# Patient Record
Sex: FEMALE | Race: BLACK OR AFRICAN AMERICAN | Employment: FULL TIME | URBAN - METROPOLITAN AREA
[De-identification: names, ages, dates, MRNs, and addresses within clinical notes are randomized per-mention and may not be internally consistent; named-entity substitution may affect disease eponyms.]

---

## 2017-01-02 ENCOUNTER — TELEPHONE (OUTPATIENT)
Dept: UROLOGY | Facility: CLINIC | Age: 57
End: 2017-01-02

## 2017-01-02 DIAGNOSIS — R10.9 FLANK PAIN: Primary | ICD-10-CM

## 2017-01-02 RX ORDER — TRAMADOL HYDROCHLORIDE 50 MG/1
50 TABLET ORAL EVERY 6 HOURS PRN
Qty: 30 TABLET | Refills: 0 | COMMUNITY
Start: 2017-01-02 | End: 2017-01-19

## 2017-01-02 NOTE — TELEPHONE ENCOUNTER
Patient called regarding tramadol script which falls under her allergies  But she states that it was IV pain meds never oral  She will be aware of itching or any side effects and stop immediately Yadira Cano LPN Staff Nurse

## 2017-01-02 NOTE — TELEPHONE ENCOUNTER
Patient called regarding her back and flank pain from her kidney stone  This pain is at level 7 and she is trying to use motrin.  Dr Galeano ordered  Tramadol 50 mg  # 30 take 1-2 every 4-6 hours as needed.  Patient called and told [facility]. Yadira Cano LPN Staff Nurse

## 2017-01-03 DIAGNOSIS — N20.0 CALCULUS OF KIDNEY: Primary | ICD-10-CM

## 2017-01-04 ENCOUNTER — DOCUMENTATION ONLY (OUTPATIENT)
Dept: UROLOGY | Facility: CLINIC | Age: 57
End: 2017-01-04

## 2017-01-04 RX ORDER — NITROFURANTOIN 25; 75 MG/1; MG/1
100 CAPSULE ORAL 2 TIMES DAILY
Qty: 20 CAPSULE | Refills: 0 | Status: SHIPPED | OUTPATIENT
Start: 2017-01-04 | End: 2017-01-19

## 2017-01-17 ENCOUNTER — ANESTHESIA EVENT (OUTPATIENT)
Dept: SURGERY | Facility: CLINIC | Age: 57
End: 2017-01-17
Payer: COMMERCIAL

## 2017-01-19 ENCOUNTER — ALLIED HEALTH/NURSE VISIT (OUTPATIENT)
Dept: SURGERY | Facility: CLINIC | Age: 57
End: 2017-01-19

## 2017-01-19 ENCOUNTER — OFFICE VISIT (OUTPATIENT)
Dept: SURGERY | Facility: CLINIC | Age: 57
End: 2017-01-19

## 2017-01-19 VITALS
WEIGHT: 179 LBS | DIASTOLIC BLOOD PRESSURE: 88 MMHG | TEMPERATURE: 98.2 F | SYSTOLIC BLOOD PRESSURE: 150 MMHG | HEIGHT: 63 IN | RESPIRATION RATE: 16 BRPM | OXYGEN SATURATION: 100 % | BODY MASS INDEX: 31.71 KG/M2 | HEART RATE: 75 BPM

## 2017-01-19 DIAGNOSIS — D64.9 ANEMIA, UNSPECIFIED TYPE: ICD-10-CM

## 2017-01-19 DIAGNOSIS — N39.0 URINARY TRACT INFECTION WITHOUT HEMATURIA, SITE UNSPECIFIED: ICD-10-CM

## 2017-01-19 DIAGNOSIS — N39.0 URINARY TRACT INFECTION WITHOUT HEMATURIA, SITE UNSPECIFIED: Primary | ICD-10-CM

## 2017-01-19 LAB
ALBUMIN UR-MCNC: NEGATIVE MG/DL
ANION GAP SERPL CALCULATED.3IONS-SCNC: 10 MMOL/L (ref 3–14)
APPEARANCE UR: ABNORMAL
BILIRUB UR QL STRIP: NEGATIVE
BUN SERPL-MCNC: 10 MG/DL (ref 7–30)
CALCIUM SERPL-MCNC: 8.7 MG/DL (ref 8.5–10.1)
CAOX CRY #/AREA URNS HPF: ABNORMAL /HPF
CHLORIDE SERPL-SCNC: 108 MMOL/L (ref 94–109)
CO2 SERPL-SCNC: 25 MMOL/L (ref 20–32)
COLOR UR AUTO: YELLOW
CREAT SERPL-MCNC: 0.73 MG/DL (ref 0.52–1.04)
ERYTHROCYTE [DISTWIDTH] IN BLOOD BY AUTOMATED COUNT: 13.4 % (ref 10–15)
GFR SERPL CREATININE-BSD FRML MDRD: 82 ML/MIN/1.7M2
GLUCOSE SERPL-MCNC: 114 MG/DL (ref 70–99)
GLUCOSE UR STRIP-MCNC: NEGATIVE MG/DL
HCT VFR BLD AUTO: 37.9 % (ref 35–47)
HGB BLD-MCNC: 11.9 G/DL (ref 11.7–15.7)
HGB UR QL STRIP: ABNORMAL
KETONES UR STRIP-MCNC: NEGATIVE MG/DL
LEUKOCYTE ESTERASE UR QL STRIP: NEGATIVE
MCH RBC QN AUTO: 28 PG (ref 26.5–33)
MCHC RBC AUTO-ENTMCNC: 31.4 G/DL (ref 31.5–36.5)
MCV RBC AUTO: 89 FL (ref 78–100)
MUCOUS THREADS #/AREA URNS LPF: PRESENT /LPF
NITRATE UR QL: NEGATIVE
PH UR STRIP: 6 PH (ref 5–7)
PLATELET # BLD AUTO: 341 10E9/L (ref 150–450)
POTASSIUM SERPL-SCNC: 4 MMOL/L (ref 3.4–5.3)
RBC # BLD AUTO: 4.25 10E12/L (ref 3.8–5.2)
RBC #/AREA URNS AUTO: 53 /HPF (ref 0–2)
SODIUM SERPL-SCNC: 143 MMOL/L (ref 133–144)
SP GR UR STRIP: 1.02 (ref 1–1.03)
SQUAMOUS #/AREA URNS AUTO: 20 /HPF (ref 0–1)
URN SPEC COLLECT METH UR: ABNORMAL
UROBILINOGEN UR STRIP-MCNC: 2 MG/DL (ref 0–2)
WBC # BLD AUTO: 10.6 10E9/L (ref 4–11)
WBC #/AREA URNS AUTO: 1 /HPF (ref 0–2)

## 2017-01-19 ASSESSMENT — LIFESTYLE VARIABLES: TOBACCO_USE: 1

## 2017-01-19 NOTE — PATIENT INSTRUCTIONS
Preparing for Your Surgery      Name:  Sophia Andrew   MRN:  [id number]   :  1960   Today's Date:  2017     Arriving for surgery:  Surgery date:  17  Surgery time:  11:45 am  Arrival time:  9:45am  Please come to:     [facility] [address]  [address] [place]  [address]    - parking is available in front of [facility] from 5:15AM to 8:00PM. If you prefer, park your car in the Green Lot.    -Proceed to the 3rd floor, check in at the Adult Surgery Waiting Lounge. [phone]    If an escort is needed stop at the Information Desk in the lobby. Inform the information person that you are here for surgery. An escort to the Adult Surgery Waiting Lounge will be provided.        What can I eat or drink?  -  You may have solid food or milk products until 8 hours prior to your surgery.  -  You may have water, apple juice or 7up/Sprite until 2 hours prior to your surgery.    Which medicines can I take?  -  Do NOT take these medications in the morning, the day of surgery:  Ibuprofen (stop 2 days before surgery)    How do I prepare myself?  -  Take two showers: one the night before surgery; and one the morning of surgery.         Use Scrubcare or Hibiclens to wash from neck down.  You may use your own shampoo and conditioner. No other hair products.   -  Do NOT use lotion, powder, deodorant, or antiperspirant the day of your surgery.  -  Do NOT wear any makeup, fingernail polish or jewelry.  -Do not bring your own medications to the hospital, except for inhalers and eye drops.  -  Bring your ID and insurance card.    Questions or Concerns:  If you have questions or concerns, please call the  Preoperative Assessment Center, Monday-Friday 7AM-7PM:  [phone]  AFTER YOUR SURGERY  Breathing exercises   Breathing exercises help you recover faster. Take deep breaths and let the air out slowly. This will:     Help you wake up after surgery.    Help  prevent complications like pneumonia.  Preventing complications will help you go home sooner.   We may give you a breathing device (incentive spirometer) to encourage you to breathe deeply.   Nausea and vomiting   You may feel sick to your stomach after surgery; if so, let your nurse know.    Pain control:  After surgery, you may have pain. Our goal is to help you manage your pain. Pain medicine will help you feel comfortable enough to do activities that will help you heal.  These activities may include breathing exercises, walking and physical therapy.   To help your health care team treat your pain we will ask: 1) If you have pain  2) where it is located 3) describe your pain in your words  Methods of pain control include medications given by mouth, vein or by nerve block for some surgeries.  We may give you a pain control pump that will:  1) Deliver the medicine through a tube placed in your vein  2) Control the amount of medicine you receive  3) Allow you to push a button to deliver a dose of pain medicine  Sequential Compression Device (SCD) or Pneumo Boots:  You may need to wear SCD S on your legs or feet. These are wraps connected to a machine that pumps in air and releases it. The repeated pumping helps prevent blood clots from forming.

## 2017-01-19 NOTE — ANESTHESIA PREPROCEDURE EVALUATION
Anesthesia Evaluation     . Pt has had prior anesthetic. Type: General and MAC    No history of anesthetic complications     ROS/MED HX    ENT/Pulmonary: Comment: Quit smoking in July 2016    (+)MONTANA risk factors hypertension, tobacco use, Past use 40 years of 1/2 PPD packs/day  , . .   (-) recent URI   Neurologic:  - neg neurologic ROS     Cardiovascular:     (+) hypertension-range: 150 / 80's, ---. Taking blood thinners Pt has received instructions: Instructions Given to patient: hold ibuprofen 1-2 before surgery. . . :. . Previous cardiac testing date:results:date: results:ECG reviewed date:08/16 results:Sinus tachycardia, T wave inversion in inferior leads and ? Anterolateral ischemia date: results:          METS/Exercise Tolerance:  >4 METS   Hematologic: Comments: CBC, panel normal    (+) Anemia, -      Musculoskeletal: Comment: Arthritis in knees  (+) arthritis, , , -       GI/Hepatic:  - neg GI/hepatic ROS      (-) GERD   Renal/Genitourinary:     (+) Nephrolithiasis ,       Endo:  - neg endo ROS       Psychiatric:  - neg psychiatric ROS       Infectious Disease:  - neg infectious disease ROS       Malignancy:      - no malignancy   Other:    (+) No chance of pregnancy              Physical Exam  Normal systems: cardiovascular and pulmonary    Airway   Mallampati: III  TM distance: >3 FB  Neck ROM: full    Dental   (+) missing    Cardiovascular   Rhythm and rate: regular and normal      Pulmonary    breath sounds clear to auscultation    Other findings:   1/19/2017 16:02  Sodium: 143  Potassium: 4.0  Chloride: 108  Carbon Dioxide: 25  Urea Nitrogen: 10  Creatinine: 0.73  GFR Estimate: 82  GFR Estimate If Black: >90...  Calcium: 8.7  Anion Gap: 10  Glucose: 114 (H)    WBC: 10.6  Hemoglobin: 11.9  Hematocrit: 37.9  Platelet Count: 341  RBC Count: 4.25  MCV: 89  MCH: 28.0  MCHC: 31.4 (L)  RDW: 13.4    1/19/2017 16:07  Color Urine: Yellow  Appearance Urine: Cloudy  Glucose Urine: Negative  Bilirubin Urine:  Negative  Ketones Urine: Negative  Specific Gravity Urine: 1.024  pH Urine: 6.0  Protein Albumin Urine: Negative  Urobilinogen mg/dL: 2.0  Nitrite Urine: Negative  Blood Urine: Small (A)  Leukocyte Esterase Urine: Negative  Source: Midstream Urine  WBC Urine: 1  RBC Urine: 53 (H)  Squamous Epithelial /HPF Urine: 20 (H)  Mucous Urine: Present (A)  Calcium Oxalate: Many (A)             PAC Discussion and Assessment    ASA Classification: 2  Case is suitable for: [place]  Anesthetic techniques and relevant risks discussed: GA  Invasive monitoring and risk discussed: No  Types:   Possibility and Risk of blood transfusion discussed: No  NPO instructions given:   Additional anesthetic preparation and risks discussed:   Needs early admission to pre-op area:   Other:     PAC Resident/NP Anesthesia Assessment:  Scheduled for Left Percutaneous Nephrolithotomy with Fulguration Of Calyceal Diverticulum  on 2/9/16 by Dr. Galeano in treatment of recurrent kidney stones.  PAC referral for risk assessment and optimization for anesthesia with comorbid conditions of:    56 y.o. With recurrent kidney stones.  This is her 13th surgery.  No problems with anesthesia.      Pre-operative considerations:  1.  Cardiac:  Functional status very good.  Can walk up to 12 blocks but doesn't do any routine exercise. 0.4% risk of major adverse cardiac event.   Has hypertension but is reluctant to get treatment.  Discussed risks.  B/p 150/88 at baseline.    2.  Pulm:  Airway feasible.  MONTANA risk:  Low.  Remote smoker.   3.  GI:  Risk of PONV score = 2.  If > 2, anti-emetic intervention recommended.  4.  : Recurrent stones.   Recent UTI completed 10 days of antibiotic (not sure which one).  Check UA, UC    VTE risk:  0.26%    Patient is optimized and is acceptable candidate for the proposed procedure.  No further diagnostic evaluation is needed.     Discussed with Dr. Cam. See recommendations below.           Reviewed and Signed by  PAC Mid-Level Provider/Resident  Mid-Level Provider/Resident: Ivis Bourgeois PA-C  Date: 1/19/17  Time: 1542    Attending Anesthesiologist Anesthesia Assessment:  56 year old very well known to anesthesia here for multiple nephrolithotomies in treatment of recurrent kidney stones. Patient/case discussed with SITA. No need to see patient. Patient is appropriate for the planned procedure without further work-up or medical management.      Reviewed and Signed by PAC Anesthesiologist  Anesthesiologist: vahe  Date: 1/19/2017  Time:   Pass/Fail: Pass  Disposition:     PAC Pharmacist Assessment:        Pharmacist:   Date:   Time:      Anesthesia Plan      History & Physical Review  History and physical reviewed and following examination; no interval change.    ASA Status:  2 .    NPO Status:  > 8 hours    Plan for General and ETT with Intravenous and Propofol induction. Maintenance will be Balanced.    PONV prophylaxis:  Ondansetron (or other 5HT-3)       Postoperative Care  Postoperative pain management:  IV analgesics and Oral pain medications.      Consents  Anesthetic plan, risks, benefits and alternatives discussed with:  Patient.  Use of blood products discussed: No .   .                          .

## 2017-01-19 NOTE — H&P
Pre-Operative H & P     CC:  Preoperative exam to assess for increased cardiopulmonary risk while undergoing surgery and anesthesia.    Date of Encounter: 2017  Primary Care Physician:  [facility]  Sophia Andrew is a 56 year old female who presents for pre-operative H & P in preparation for Left Percutaneous Nephrolithotomy with Fulguration Of Calyceal Diverticulum  on 16 by Dr. Galeano in treatment of recurrent kidney stones.  Surgery at [facility].     History of recurrent kidney stones.  This is her 13th surgery.  Recent  UTI, treated with 10 days of antibiotic.  No dysuria.  No hematuria.     History is obtained from the patient and electronic health record.     Past Medical History  Past Medical History   Diagnosis Date     Hypertension      Kidney stones 2011     nephrostomy tube       Past Surgical History  Past Surgical History   Procedure Laterality Date     Hysterectomy       Extracorporeal shock wave lithotripsy (eswl)       right kidney      section  X2     Percutaneous nephrolithotomy  2011     Procedure:PERCUTANEOUS NEPHROLITHOTOMY; Surgeon:MAYTE BOWERS; [facility]     Cystoscopy, ureteroscopy, combined  2011     Procedure:COMBINED CYSTOSCOPY, URETEROSCOPY; Holmium Laser Lithotripsy ; Surgeon:MAYTE BOWERS; [facility] OR      section       x2     Laser holmium nephrolithotomy via percutaneous nephrostomy  10/20/2011     Procedure:LASER HOLMIUM NEPHROLITHOTOMY VIA PERCUTANEOUS NEPHROSTOMY; Left Holmium Laser of a Caliceal Diverticulum, Stent Placement with Balloon Dilatation, Ureteroscopy, Cystoscopy.; Surgeon:MAYTE BOWERS; Location[facility]     Laser holmium lithotripsy ureter(s), insert stent, combined  2011     Procedure:COMBINED CYSTOSCOPY, URETEROSCOPY, LASER HOLMIUM LITHOTRIPSY URETER(S), INSERT STENT; Surgeon:MAYTE BOWERS; Location[facility]     Laser  holmium nephrolithotomy via percutaneous nephrostomy  5/5/2011     Procedure:LASER HOLMIUM NEPHROLITHOTOMY VIA PERCUTANEOUS NEPHROSTOMY; Ureteroscopy, retrograde pylogram, left stent placement.  laser not used, laser setup and not used.; Surgeon:MAYTE BOWERS; Location[facility]     Combined cystoscopy, insert stent ureter(s)  1/15/2014     Procedure: COMBINED CYSTOSCOPY, INSERT STENT URETER(S);  Cystoscopy, Right ureteral stent placement;  Surgeon: Patience Coy MD;  [facility]     Laser holmium lithotripsy ureter(s), insert stent, combined  1/29/2014     Procedure: COMBINED CYSTOSCOPY, URETEROSCOPY, LASER HOLMIUM LITHOTRIPSY URETER(S), INSERT STENT;  Cystoscopy, Right Ureteroscopy With Holmium Laser, Right Stent Placement  ;  Surgeon: Mayte Bowers MD;  [facility]     Combined cystoscopy, retrogrades, ureteroscopy, laser holmium lithotripsy ureter(s), insert stent Left 6/4/2015     Procedure: COMBINED CYSTOSCOPY, RETROGRADES, URETEROSCOPY, LASER HOLMIUM LITHOTRIPSY URETER(S), INSERT STENT;  Surgeon: Mayte Bowers MD;  [facility]     Laser holmium lithotripsy ureter(s), insert stent, combined Left 6/17/2015     Procedure: COMBINED CYSTOSCOPY, URETEROSCOPY, LASER HOLMIUM LITHOTRIPSY URETER(S), INSERT STENT;  Surgeon: Mayte Bowers MD;  [facility]     Combined cystoscopy, insert stent ureter(s) Left 7/19/2015     Procedure: COMBINED CYSTOSCOPY, INSERT STENT URETER(S);  Surgeon: Marisol Grubbs MD;  [facility]     Gyn surgery       Laser holmium lithotripsy ureter(s), insert stent, combined Bilateral 8/6/2015     Procedure: COMBINED CYSTOSCOPY, URETEROSCOPY, LASER HOLMIUM LITHOTRIPSY URETER(S), INSERT STENT;  Surgeon: Mayte Bowers MD;  [facility]     Laser holmium lithotripsy ureter(s), insert stent, combined Left 8/19/2015     Procedure: COMBINED CYSTOSCOPY, URETEROSCOPY, LASER HOLMIUM LITHOTRIPSY URETER(S), INSERT STENT;  Surgeon:  Yaakov Purcell MD;  [facility]     Combined cystoscopy, retrogrades, ureteroscopy, laser holmium lithotripsy ureter(s), insert stent Left 9/9/2015     Procedure: COMBINED CYSTOSCOPY, RETROGRADES, URETEROSCOPY, LASER HOLMIUM LITHOTRIPSY URETER(S), INSERT STENT;  Surgeon: Yaakov Purcell MD;  [facility]     Cystoscopy, retrogrades, insert stent ureter(s), combined Left 8/9/2016     Procedure: COMBINED CYSTOSCOPY, RETROGRADES, INSERT STENT URETER(S);  Surgeon: Daniel Galeano MD;  [facility]     Laser holmium lithotripsy ureter(s), insert stent, combined Left 8/18/2016     Procedure: COMBINED CYSTOSCOPY, URETEROSCOPY, LASER HOLMIUM LITHOTRIPSY URETER(S), INSERT STENT;  Surgeon: Daniel Galeano MD;  [facility]       Hx of Blood transfusions/reactions: no     Hx of abnormal bleeding or anti-platelet use: ibuprofen prn    Menstrual history: No LMP recorded. Patient has had a hysterectomy.:     Steroid use in the last year: no    Personal or FH with difficulty with Anesthesia:  no    Prior to Admission Medications  Current Outpatient Prescriptions   Medication Sig Dispense Refill     ibuprofen (ADVIL,MOTRIN) 600 MG tablet Take 1 tablet (600 mg) by mouth every 6 hours as needed for moderate pain 30 tablet 1       Allergies  Allergies   Allergen Reactions     Codeine Sulfate Nausea     Hydromorphone Itching     Generalized body itching; required discontinuing the drug     Pollen Extract      Runny nose       Social History  Social History     Social History     Marital Status: Single     Spouse Name: N/A     Number of Children: N/A     Years of Education: N/A     Occupational History     Not on file.     Social History Main Topics     Smoking status: Former Smoker -- 0.50 packs/day for 40 years     Types: Cigarettes     Quit date: 07/24/2016     Smokeless tobacco: Never Used     Alcohol Use: No     Drug Use: No     Sexual Activity: No     Other Topics Concern     Not on file      Social History Narrative    Single.  Engaged.      Works at [employer]    2 children - 1 son with hypothyroid    7 siblings - 1 sister with history of CVA.          Mother :  with history of blood clots., diabetes mellitus, hypertension, heart disease    Father living (hx of cancer)        No family history of bleeding, clotting disorders or complications with anesthesia.           Family History  Family History   Problem Relation Age of Onset     DIABETES Sister      DIABETES Mother      DIABETES Other      DIABETES Maternal Grandmother      DIABETES Maternal Grandfather      Prostate Cancer Father      Thyroid Disease Mother      Coronary Artery Disease No family hx of      Hypertension No family hx of      Hyperlipidemia No family hx of      CEREBROVASCULAR DISEASE No family hx of      Breast Cancer No family hx of      Colon Cancer No family hx of      Other Cancer No family hx of      Depression No family hx of      Anxiety Disorder No family hx of      MENTAL ILLNESS No family hx of      Substance Abuse No family hx of      Anesthesia Reaction No family hx of      Asthma No family hx of      OSTEOPOROSIS No family hx of      Genetic Disorder No family hx of      Obesity No family hx of          ROS/MED HX  The complete review of systems is negative other than noted in the HPI or here.     ENT/Pulmonary:     (+)MONTANA risk factors hypertension, tobacco use, Past use 40 years of 1/2 PPD packs/day  , . .    Neurologic:  - neg neurologic ROS     Cardiovascular:     (+) hypertension-range: 150 / 80's, ---. Taking blood thinners Pt has received instructions: Instructions Given to patient: hold ibuprofen 1-2 before surgery. . . :. . Previous cardiac testing date:results:date: results:ECG reviewed date: results: date: results:          METS/Exercise Tolerance:  >4 METS   Hematologic:     (+) Anemia, -      Musculoskeletal: Comment: Arthritis in knees  (+) arthritis, , , -       GI/Hepatic:  - neg GI/hepatic ROS   "     Renal/Genitourinary:     (+) Nephrolithiasis ,       Endo:  - neg endo ROS       Psychiatric:  - neg psychiatric ROS       Infectious Disease:    neg     Malignancy:      - no malignancy   Other:    (+) No chance of pregnancy          Temp: 98.2  F (36.8  C) Temp src: Oral BP: 150/88 mmHg Pulse: 75   Resp: 16 SpO2: 100 %         179 lbs 0 oz  5' 3\"   Body mass index is 31.72 kg/(m^2).       Physical Exam  Constitutional: Awake, alert, cooperative, no apparent distress, and appears stated age.  Eyes: Pupils equal, round and reactive to light, extra ocular muscles intact, sclera clear, conjunctiva normal.  HENT: Normocephalic, oral pharynx with moist mucus membranes, good dentition. No goiter appreciated.   Respiratory: Clear to auscultation bilaterally, no crackles or wheezing.  Cardiovascular: Regular rate and rhythm, normal S1 and S2, and no murmur noted.  Carotids +2, no bruits. No edema. Palpable pulses to  DP and PT arteries.   GI: Normal bowel sounds, soft, non-distended, non-tender, no masses palpated, no hepatosplenomegaly.   Lymph/Hematologic: No cervical lymphadenopathy and no supraclavicular lymphadenopathy.  Genitourinary:  Mild left CVA tenderness  Skin: Warm and dry.  No rashes at anticipated surgical site.   Musculoskeletal: Full ROM of neck. There is no redness, warmth, or swelling of the joints. Gross motor strength is normal.    Neurologic: Awake, alert, oriented to name, place and time. Cranial nerves II-XII are grossly intact. Gait is normal.   Neuropsychiatric: Calm, cooperative. Normal affect.     Labs: (personally reviewed)    1/19/2017 16:02  Sodium: 143  Potassium: 4.0  Chloride: 108  Carbon Dioxide: 25  Urea Nitrogen: 10  Creatinine: 0.73  GFR Estimate: 82  GFR Estimate If Black: >90...  Calcium: 8.7  Anion Gap: 10  Glucose: 114 (H)  WBC: 10.6  Hemoglobin: 11.9  Hematocrit: 37.9  Platelet Count: 341  RBC Count: 4.25  MCV: 89  MCH: 28.0  MCHC: 31.4 (L)  RDW: 13.4    1/19/2017 " 16:07  Color Urine: Yellow  Appearance Urine: Cloudy  Glucose Urine: Negative  Bilirubin Urine: Negative  Ketones Urine: Negative  Specific Gravity Urine: 1.024  pH Urine: 6.0  Protein Albumin Urine: Negative  Urobilinogen mg/dL: 2.0  Nitrite Urine: Negative  Blood Urine: Small (A)  Leukocyte Esterase Urine: Negative  Source: Midstream Urine  WBC Urine: 1  RBC Urine: 53 (H)  Squamous Epithelial /HPF Urine: 20 (H)  Mucous Urine: Present (A)  Calcium Oxalate: Many (A)      ASSESSMENT and PLAN  Sophia Andrew is a 56 year old female scheduled to undergo Left Percutaneous Nephrolithotomy with Fulguration Of Calyceal Diverticulum  on 2/9/16 by Dr. Galeano in treatment of recurrent kidney stones.  PAC referral for risk assessment and optimization for anesthesia with comorbid conditions of:    56 y.o. With recurrent kidney stones.  This is her 13th surgery.  No problems with anesthesia.      Pre-operative considerations:  1.  Cardiac:  Functional status very good.  Can walk up to 12 blocks but doesn't do any routine exercise. 0.4% risk of major adverse cardiac event.   Has hypertension but is reluctant to get treatment.  Discussed risks.  B/p 150/88 at baseline.    2.  Pulm:  Airway feasible.  MONTANA risk:  Low.  Remote smoker.   3.  GI:  Risk of PONV score = 2.  If > 2, anti-emetic intervention recommended.  4.  : Recurrent stones.   Recent UTI completed 10 days of antibiotic (not sure which one).  Check UA, UC    VTE risk:  0.26%    Patient is optimized and is acceptable candidate for the proposed procedure.  No further diagnostic evaluation is needed.Patient was discussed with Dr Cam.    Ivis Bourgeois PA-C  Preoperative Assessment Center  [facility]  Clinic and Surgery Center  Phone: [phone]  Fax: [phone]

## 2017-01-20 LAB
BACTERIA SPEC CULT: NORMAL
Lab: NORMAL
MICRO REPORT STATUS: NORMAL
SPECIMEN SOURCE: NORMAL

## 2017-01-31 ENCOUNTER — PRE VISIT (OUTPATIENT)
Dept: UROLOGY | Facility: CLINIC | Age: 57
End: 2017-01-31

## 2017-01-31 NOTE — TELEPHONE ENCOUNTER
Patient coming in to review [facility] results. Results are scanned into EPIC. No need to call patient

## 2017-02-02 DIAGNOSIS — Z87.440 PERSONAL HISTORY OF URINARY TRACT INFECTION: Primary | ICD-10-CM

## 2017-02-02 RX ORDER — CIPROFLOXACIN 500 MG/1
500 TABLET, FILM COATED ORAL 2 TIMES DAILY
Qty: 14 TABLET | Refills: 0 | Status: ON HOLD | OUTPATIENT
Start: 2017-02-02 | End: 2017-02-09

## 2017-02-03 ENCOUNTER — TELEPHONE (OUTPATIENT)
Dept: UROLOGY | Facility: CLINIC | Age: 57
End: 2017-02-03

## 2017-02-03 NOTE — TELEPHONE ENCOUNTER
----- Message from Daniel Galeano MD sent at 2/2/2017 11:09 PM CST -----  This patient is on my OR schedule for next Thursday.  Do you think it would be possible to make sure she is starting preop abx (Cipro) tomorrow.  I placed a new order if she needs one.    Thanks,  Darrin

## 2017-02-03 NOTE — TELEPHONE ENCOUNTER
Called pt to let her know that she is to start Cipro today 2/3 for her surgery on Thursday per Dr. Galeano. Rx sent to pts pharmacy. No answer, left a detailed message. Will try again later.   Nisreen Quiroz RN

## 2017-02-06 ENCOUNTER — TELEPHONE (OUTPATIENT)
Dept: UROLOGY | Facility: CLINIC | Age: 57
End: 2017-02-06

## 2017-02-07 ENCOUNTER — OFFICE VISIT (OUTPATIENT)
Dept: UROLOGY | Facility: CLINIC | Age: 57
End: 2017-02-07

## 2017-02-07 VITALS
DIASTOLIC BLOOD PRESSURE: 89 MMHG | WEIGHT: 178 LBS | SYSTOLIC BLOOD PRESSURE: 150 MMHG | HEART RATE: 80 BPM | BODY MASS INDEX: 31.54 KG/M2

## 2017-02-07 DIAGNOSIS — N20.0 CALCULUS OF KIDNEY: Primary | ICD-10-CM

## 2017-02-07 ASSESSMENT — PAIN SCALES - GENERAL: PAINLEVEL: NO PAIN (0)

## 2017-02-07 NOTE — Clinical Note
2/7/2017       RE: Sophia Andrew  [address]  [id number]  [facility] [id number]     Dear Colleague,    Thank you for referring your patient, Sophia Andrew, to the [facility] UROLOGY AND INST FOR PROSTATE AND UROLOGIC CANCERS at [facility]. Please see a copy of my visit note below.             UROLOGY FOLLOW-UP NOTE          Chief Complaint:   Today I had the pleasure of seeing Ms. Sophia Andrew in follow-up for a chief complaint of chronic left flank pain        Interval Update    Sophia Andrew is a very pleasant 56 year old female last seen 2 months ago.  At our last visit she noted persistent and worsening left flank pain.  THis has been ongoing for several years.  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  At last visit we discussed repeat attempt at left percutaneous stone removal as a last resort if no other etiology could be identified for her pain.  She has since been treated with antibiotics with no improvement.  A renal scan was performed given known history of left ureteral stricture with no evidence of obstruction. Since last being seen she also completed a 24 hour urine collection (known history of brushite stones).          Physical Exam:   Patient is a 56 year old  female   Vitals: Blood pressure 150/89, pulse 80, weight 80.74 kg (178 lb), not currently breastfeeding.  General Appearance Adult: Alert, no acute distress, oriented  HENT: throat/mouth:normal, good dentition  Neck: No adenopathy,masses or thyromegaly  Lungs: no respiratory distress, or pursed lip breathing  Heart: No obvious jugular venous distension present  Abdomen: soft, nontender, no organomegaly or masses, Body mass index is 31.54 kg/(m^2).  Lymphatics: No cervical or supraclavicular adenopathy  Musculoskeltal: extremities normal, no peripheral edema  Skin: no suspicious lesions or  precious  Neuro: Alert, oriented, speech and mentation normal  Psych: affect and mood normal  Gait: Normal      Labs and Pathology:    I personally reviewed all applicable laboratory data and went over findings with patient  Significant for:    CBC RESULTS:  Recent Labs   Lab Test  01/19/17   1602  08/09/16   1118  08/01/16   1256  06/18/16   1059   WBC  10.6  13.4*  10.6  10.2   HGB  11.9  11.3*  11.9  12.2   PLT  341  516*  379  338        BMP RESULTS:  Recent Labs   Lab Test  01/19/17   1602  08/09/16   1118  08/01/16   1256  06/18/16   1059   NA  143  140  138  140   POTASSIUM  4.0  4.2  3.8  4.1   CHLORIDE  108  106  106  111*   CO2  25  28  26  27   ANIONGAP  10  6  5  2*   GLC  114*  96  102*  83   BUN  10  14  15  11   CR  0.73  0.76  0.84  0.79   GFRESTIMATED  82  79  71  75   GFRESTBLACK  >90   GFR Calc    >90   GFR Calc    86  >90   GFR Calc     FARSHAD  8.7  9.4  8.8  9.0       UA RESULTS:   Recent Labs   Lab Test  01/19/17   1607  08/09/16   0816  08/01/16   1327   SG  1.024  1.017  1.016   URINEPH  6.0  6.0  6.0   NITRITE  Negative  Negative  Negative   RBCU  53*  2  2   WBCU  1  14*  10*     12/16 - 24 hour urine, notable for severe low urine volume (0.9 L) as well as hypercalciuria (245)      Imaging:    I personally reviewed all applicable imaging and went over findings with patient.  Significant for   Renal Scan 12/15/16 -   There is normal and symmetric perfusion of both kidneys. The split  functional measurements are: 46.3% function on the left as compared to  53.7% function on the right.     The excretion curves demonstrates prompt uptake and excretion from  both kidneys which appear symmetrically. After Lasix administration  there was further washout from the kidneys and there is no  hydronephrosis.                                                                       Impression:     Normal Lasix renogram with without evidence of hydronephrosis  or  Obstruction.    KUB 16   IMPRESSION:  1.  Left nephrolithiasis, without definitive change from the CT on  10/4/2016.  2.  Nonobstructive bowel gas pattern.    CT 10/4/2016  Impression:    1. Interval resolution of obstructing left ureteral stone as well as  associated hydroureteronephrosis.  2. Decrease in stone burden of the left kidney. Residual  nonobstructive stones can be seen in the mid left kidney as described  above.         Past Medical History:     Past Medical History   Diagnosis Date     Hypertension      Kidney stones 2011     nephrostomy tube            Past Surgical History:     Past Surgical History   Procedure Laterality Date     Hysterectomy       Extracorporeal shock wave lithotripsy (eswl)       right kidney      section  X2     Percutaneous nephrolithotomy  2011     Procedure:PERCUTANEOUS NEPHROLITHOTOMY; Surgeon:MAYTE BOWERS; [facility]     Cystoscopy, ureteroscopy, combined  2011     Procedure:COMBINED CYSTOSCOPY, URETEROSCOPY; Holmium Laser Lithotripsy ; Surgeon:MAYTE BOWERS; [facility]      section       x2     Laser holmium nephrolithotomy via percutaneous nephrostomy  10/20/2011     Procedure:LASER HOLMIUM NEPHROLITHOTOMY VIA PERCUTANEOUS NEPHROSTOMY; Left Holmium Laser of a Caliceal Diverticulum, Stent Placement with Balloon Dilatation, Ureteroscopy, Cystoscopy.; Surgeon:MAYTE BOWERS; [facility]     Laser holmium lithotripsy ureter(s), insert stent, combined  2011     Procedure:COMBINED CYSTOSCOPY, URETEROSCOPY, LASER HOLMIUM LITHOTRIPSY URETER(S), INSERT STENT; Surgeon:MAYTE BOWERS; [facility]     Laser holmium nephrolithotomy via percutaneous nephrostomy  2011     Procedure:LASER HOLMIUM NEPHROLITHOTOMY VIA PERCUTANEOUS NEPHROSTOMY; Ureteroscopy, retrograde pylogram, left stent placement.  laser not used, laser setup and not used.; Surgeon:MAYTE BOWERS; [facility]     Combined  cystoscopy, insert stent ureter(s)  1/15/2014     Procedure: COMBINED CYSTOSCOPY, INSERT STENT URETER(S);  Cystoscopy, Right ureteral stent placement;  Surgeon: Patience Coy MD;  [facility]     Laser holmium lithotripsy ureter(s), insert stent, combined  1/29/2014     Procedure: COMBINED CYSTOSCOPY, URETEROSCOPY, LASER HOLMIUM LITHOTRIPSY URETER(S), INSERT STENT;  Cystoscopy, Right Ureteroscopy With Holmium Laser, Right Stent Placement  ;  Surgeon: Yaakov Purcell MD;  [facility]     Combined cystoscopy, retrogrades, ureteroscopy, laser holmium lithotripsy ureter(s), insert stent Left 6/4/2015     Procedure: COMBINED CYSTOSCOPY, RETROGRADES, URETEROSCOPY, LASER HOLMIUM LITHOTRIPSY URETER(S), INSERT STENT;  Surgeon: Yaakov Purcell MD;  [facility]     Laser holmium lithotripsy ureter(s), insert stent, combined Left 6/17/2015     Procedure: COMBINED CYSTOSCOPY, URETEROSCOPY, LASER HOLMIUM LITHOTRIPSY URETER(S), INSERT STENT;  Surgeon: Yaakov Purcell MD;  [facility]     Combined cystoscopy, insert stent ureter(s) Left 7/19/2015     Procedure: COMBINED CYSTOSCOPY, INSERT STENT URETER(S);  Surgeon: Marisol Grubbs MD;  [facility]     Gyn surgery       Laser holmium lithotripsy ureter(s), insert stent, combined Bilateral 8/6/2015     Procedure: COMBINED CYSTOSCOPY, URETEROSCOPY, LASER HOLMIUM LITHOTRIPSY URETER(S), INSERT STENT;  Surgeon: Yaakov Purcell MD;  [facility]     Laser holmium lithotripsy ureter(s), insert stent, combined Left 8/19/2015     Procedure: COMBINED CYSTOSCOPY, URETEROSCOPY, LASER HOLMIUM LITHOTRIPSY URETER(S), INSERT STENT;  Surgeon: Yaakov Purcell MD;  [facility]     Combined cystoscopy, retrogrades, ureteroscopy, laser holmium lithotripsy ureter(s), insert stent Left 9/9/2015     Procedure: COMBINED CYSTOSCOPY, RETROGRADES, URETEROSCOPY, LASER HOLMIUM LITHOTRIPSY URETER(S), INSERT STENT;  Surgeon: Binh  Yaakov Astorga MD;  [facility]     Cystoscopy, retrogrades, insert stent ureter(s), combined Left 8/9/2016     Procedure: COMBINED CYSTOSCOPY, RETROGRADES, INSERT STENT URETER(S);  Surgeon: Daniel Galeano MD;  [facility]     Laser holmium lithotripsy ureter(s), insert stent, combined Left 8/18/2016     Procedure: COMBINED CYSTOSCOPY, URETEROSCOPY, LASER HOLMIUM LITHOTRIPSY URETER(S), INSERT STENT;  Surgeon: Daniel Galeano MD;  [facility]            Medications     Current Outpatient Prescriptions   Medication     ciprofloxacin (CIPRO) 500 MG tablet     ibuprofen (ADVIL,MOTRIN) 600 MG tablet     No current facility-administered medications for this visit.            Family History:     Family History   Problem Relation Age of Onset     DIABETES Sister      DIABETES Mother      DIABETES Other      DIABETES Maternal Grandmother      DIABETES Maternal Grandfather      Prostate Cancer Father      Thyroid Disease Mother      Coronary Artery Disease No family hx of      Hypertension No family hx of      Hyperlipidemia No family hx of      CEREBROVASCULAR DISEASE No family hx of      Breast Cancer No family hx of      Colon Cancer No family hx of      Other Cancer No family hx of      Depression No family hx of      Anxiety Disorder No family hx of      MENTAL ILLNESS No family hx of      Substance Abuse No family hx of      Anesthesia Reaction No family hx of      Asthma No family hx of      OSTEOPOROSIS No family hx of      Genetic Disorder No family hx of      Obesity No family hx of             Social History:     Social History     Social History     Marital Status: Single     Spouse Name: N/A     Number of Children: N/A     Years of Education: N/A     Occupational History     Not on file.     Social History Main Topics     Smoking status: Former Smoker -- 0.50 packs/day for 40 years     Types: Cigarettes     Quit date: 07/24/2016     Smokeless tobacco: Never Used     Alcohol Use: No     Drug  Use: No     Sexual Activity: No     Other Topics Concern     Not on file     Social History Narrative    Single.  Engaged.      Works at [employer]    2 children - 1 son with hypothyroid    7 siblings - 1 sister with history of CVA.          Mother :  with history of blood clots., diabetes mellitus, hypertension, heart disease    Father living (hx of cancer)        No family history of bleeding, clotting disorders or complications with anesthesia.                Allergies:   Codeine sulfate; Hydromorphone; and Pollen extract         Review of Systems:  From intake questionnaire   Negative 14 system review except as noted on HPI, nurse's note.           Assessment/Plan   57 yo F with persistant left flank pain and known residual stone in calyceal diverticulum  -Readressed treatment options for left flank pain.  At this point suspect stone in diverticulum may be contributing though explained that this is not definite and would be impossible to know without stone removal.  Discussed that redo calyceal diverticulectomy is technically challenging procedure in particular because the stone is small (sub-cm).  She is aware that complete stone removal/fulguration of tic cavity may not be feasible but would like to proceed as she is quite frustrated by her chronic left flnak pain which she says is preventing her from living her normal life.  -Reviewed risks of procedure including bleeding, infection, damage to adjacent structures, incomplete stone removal, need for staged procedure, need for post-op neph tube  -Is on prophylactic cipro for mixed alan on urine culture, though UA with 1 WBC, suspect contmainant.  -Proceed with left PCNL, possible fulguration of calyceal diverticulum  -In terms of stone prevention, main risk is dehydration.  Reviewed strategies to increase fluid intake to 3L/day.  -Also discussed starting thiazide to decrease urinary calcium.  Will readress this post procedure.    Daniel Galeano  MD

## 2017-02-09 ENCOUNTER — ANESTHESIA (OUTPATIENT)
Dept: SURGERY | Facility: CLINIC | Age: 57
End: 2017-02-09
Payer: COMMERCIAL

## 2017-02-09 ENCOUNTER — APPOINTMENT (OUTPATIENT)
Dept: GENERAL RADIOLOGY | Facility: CLINIC | Age: 57
End: 2017-02-09
Attending: UROLOGY
Payer: COMMERCIAL

## 2017-02-09 ENCOUNTER — HOSPITAL ENCOUNTER (OUTPATIENT)
Facility: CLINIC | Age: 57
Discharge: HOME | End: 2017-02-09
Attending: UROLOGY | Admitting: UROLOGY
Payer: COMMERCIAL

## 2017-02-09 VITALS
WEIGHT: 176.37 LBS | TEMPERATURE: 98.1 F | HEIGHT: 63 IN | BODY MASS INDEX: 31.25 KG/M2 | RESPIRATION RATE: 16 BRPM | OXYGEN SATURATION: 98 % | DIASTOLIC BLOOD PRESSURE: 87 MMHG | SYSTOLIC BLOOD PRESSURE: 118 MMHG

## 2017-02-09 DIAGNOSIS — N20.0 NEPHROLITHIASIS: ICD-10-CM

## 2017-02-09 DIAGNOSIS — Z87.440 PERSONAL HISTORY OF URINARY TRACT INFECTION: Primary | ICD-10-CM

## 2017-02-09 LAB
ABO + RH BLD: NORMAL
ABO + RH BLD: NORMAL
BLD GP AB SCN SERPL QL: NORMAL
BLOOD BANK CMNT PATIENT-IMP: NORMAL
HCG UR QL: NEGATIVE
SPECIMEN EXP DATE BLD: NORMAL

## 2017-02-09 PROCEDURE — 40000170 ZZH STATISTIC PRE-PROCEDURE ASSESSMENT II: Performed by: UROLOGY

## 2017-02-09 PROCEDURE — 25000125 ZZHC RX 250: Performed by: NURSE ANESTHETIST, CERTIFIED REGISTERED

## 2017-02-09 PROCEDURE — 71000014 ZZH RECOVERY PHASE 1 LEVEL 2 FIRST HR: Performed by: UROLOGY

## 2017-02-09 PROCEDURE — 37000009 ZZH ANESTHESIA TECHNICAL FEE, EACH ADDTL 15 MIN: Performed by: UROLOGY

## 2017-02-09 PROCEDURE — 36000061 ZZH SURGERY LEVEL 3 W FLUORO 1ST 30 MIN - UMMC: Performed by: UROLOGY

## 2017-02-09 PROCEDURE — 81025 URINE PREGNANCY TEST: CPT | Performed by: ANESTHESIOLOGY

## 2017-02-09 PROCEDURE — C1729 CATH, DRAINAGE: HCPCS | Performed by: UROLOGY

## 2017-02-09 PROCEDURE — 25000128 H RX IP 250 OP 636: Performed by: ANESTHESIOLOGY

## 2017-02-09 PROCEDURE — C2617 STENT, NON-COR, TEM W/O DEL: HCPCS | Performed by: UROLOGY

## 2017-02-09 PROCEDURE — 27211024 ZZHC OR SUPPLY OTHER OPNP: Performed by: UROLOGY

## 2017-02-09 PROCEDURE — 25000128 H RX IP 250 OP 636: Performed by: UROLOGY

## 2017-02-09 PROCEDURE — 36415 COLL VENOUS BLD VENIPUNCTURE: CPT | Performed by: UROLOGY

## 2017-02-09 PROCEDURE — 37000008 ZZH ANESTHESIA TECHNICAL FEE, 1ST 30 MIN: Performed by: UROLOGY

## 2017-02-09 PROCEDURE — C1769 GUIDE WIRE: HCPCS | Performed by: UROLOGY

## 2017-02-09 PROCEDURE — 71000027 ZZH RECOVERY PHASE 2 EACH 15 MINS: Performed by: UROLOGY

## 2017-02-09 PROCEDURE — 25500064 ZZH RX 255 OP 636: Performed by: UROLOGY

## 2017-02-09 PROCEDURE — 86901 BLOOD TYPING SEROLOGIC RH(D): CPT | Performed by: UROLOGY

## 2017-02-09 PROCEDURE — 40000278 XR SURGERY CARM FLUORO LESS THAN 5 MIN: Mod: TC

## 2017-02-09 PROCEDURE — 27210794 ZZH OR GENERAL SUPPLY STERILE: Performed by: UROLOGY

## 2017-02-09 PROCEDURE — 25000125 ZZHC RX 250: Performed by: UROLOGY

## 2017-02-09 PROCEDURE — 25800025 ZZH RX 258: Performed by: ANESTHESIOLOGY

## 2017-02-09 PROCEDURE — 25000566 ZZH SEVOFLURANE, EA 15 MIN: Performed by: UROLOGY

## 2017-02-09 PROCEDURE — C9399 UNCLASSIFIED DRUGS OR BIOLOG: HCPCS | Performed by: NURSE ANESTHETIST, CERTIFIED REGISTERED

## 2017-02-09 PROCEDURE — 25000125 ZZHC RX 250: Performed by: ANESTHESIOLOGY

## 2017-02-09 PROCEDURE — 86850 RBC ANTIBODY SCREEN: CPT | Performed by: UROLOGY

## 2017-02-09 PROCEDURE — 25000128 H RX IP 250 OP 636: Performed by: NURSE ANESTHETIST, CERTIFIED REGISTERED

## 2017-02-09 PROCEDURE — 86900 BLOOD TYPING SEROLOGIC ABO: CPT | Performed by: UROLOGY

## 2017-02-09 PROCEDURE — 36000059 ZZH SURGERY LEVEL 3 EA 15 ADDTL MIN UMMC: Performed by: UROLOGY

## 2017-02-09 DEVICE — IMPLANTABLE DEVICE: Type: IMPLANTABLE DEVICE | Site: URETER | Status: FUNCTIONAL

## 2017-02-09 RX ORDER — NALOXONE HYDROCHLORIDE 0.4 MG/ML
.1-.4 INJECTION, SOLUTION INTRAMUSCULAR; INTRAVENOUS; SUBCUTANEOUS
Status: DISCONTINUED | OUTPATIENT
Start: 2017-02-09 | End: 2017-02-09 | Stop reason: HOSPADM

## 2017-02-09 RX ORDER — EPHEDRINE SULFATE 50 MG/ML
INJECTION, SOLUTION INTRAMUSCULAR; INTRAVENOUS; SUBCUTANEOUS PRN
Status: DISCONTINUED | OUTPATIENT
Start: 2017-02-09 | End: 2017-02-09

## 2017-02-09 RX ORDER — HYDRALAZINE HYDROCHLORIDE 20 MG/ML
2.5-5 INJECTION INTRAMUSCULAR; INTRAVENOUS EVERY 10 MIN PRN
Status: DISCONTINUED | OUTPATIENT
Start: 2017-02-09 | End: 2017-02-09 | Stop reason: HOSPADM

## 2017-02-09 RX ORDER — MEPERIDINE HYDROCHLORIDE 25 MG/ML
12.5 INJECTION INTRAMUSCULAR; INTRAVENOUS; SUBCUTANEOUS
Status: DISCONTINUED | OUTPATIENT
Start: 2017-02-09 | End: 2017-02-09 | Stop reason: HOSPADM

## 2017-02-09 RX ORDER — FENTANYL CITRATE 50 UG/ML
25-50 INJECTION, SOLUTION INTRAMUSCULAR; INTRAVENOUS EVERY 5 MIN PRN
Status: DISCONTINUED | OUTPATIENT
Start: 2017-02-09 | End: 2017-02-09 | Stop reason: HOSPADM

## 2017-02-09 RX ORDER — SODIUM CHLORIDE, SODIUM LACTATE, POTASSIUM CHLORIDE, CALCIUM CHLORIDE 600; 310; 30; 20 MG/100ML; MG/100ML; MG/100ML; MG/100ML
INJECTION, SOLUTION INTRAVENOUS CONTINUOUS
Status: DISCONTINUED | OUTPATIENT
Start: 2017-02-09 | End: 2017-02-09 | Stop reason: HOSPADM

## 2017-02-09 RX ORDER — ONDANSETRON 2 MG/ML
4 INJECTION INTRAMUSCULAR; INTRAVENOUS EVERY 30 MIN PRN
Status: DISCONTINUED | OUTPATIENT
Start: 2017-02-09 | End: 2017-02-09 | Stop reason: HOSPADM

## 2017-02-09 RX ORDER — AMOXICILLIN 250 MG
1-2 CAPSULE ORAL 2 TIMES DAILY PRN
Qty: 30 TABLET | Refills: 0 | Status: SHIPPED | OUTPATIENT
Start: 2017-02-09 | End: 2017-03-28

## 2017-02-09 RX ORDER — DEXAMETHASONE SODIUM PHOSPHATE 4 MG/ML
4 INJECTION, SOLUTION INTRA-ARTICULAR; INTRALESIONAL; INTRAMUSCULAR; INTRAVENOUS; SOFT TISSUE EVERY 10 MIN PRN
Status: DISCONTINUED | OUTPATIENT
Start: 2017-02-09 | End: 2017-02-09 | Stop reason: HOSPADM

## 2017-02-09 RX ORDER — FENTANYL CITRATE 50 UG/ML
25-50 INJECTION, SOLUTION INTRAMUSCULAR; INTRAVENOUS
Status: DISCONTINUED | OUTPATIENT
Start: 2017-02-09 | End: 2017-02-09 | Stop reason: HOSPADM

## 2017-02-09 RX ORDER — ONDANSETRON 4 MG/1
4 TABLET, ORALLY DISINTEGRATING ORAL EVERY 30 MIN PRN
Status: DISCONTINUED | OUTPATIENT
Start: 2017-02-09 | End: 2017-02-09 | Stop reason: HOSPADM

## 2017-02-09 RX ORDER — CIPROFLOXACIN 500 MG/1
500 TABLET, FILM COATED ORAL 2 TIMES DAILY
Qty: 14 TABLET | Refills: 0 | Status: SHIPPED | OUTPATIENT
Start: 2017-02-09 | End: 2017-02-16

## 2017-02-09 RX ORDER — FENTANYL CITRATE 50 UG/ML
INJECTION, SOLUTION INTRAMUSCULAR; INTRAVENOUS PRN
Status: DISCONTINUED | OUTPATIENT
Start: 2017-02-09 | End: 2017-02-09

## 2017-02-09 RX ORDER — TAMSULOSIN HYDROCHLORIDE 0.4 MG/1
0.4 CAPSULE ORAL DAILY
Qty: 50 CAPSULE | Refills: 0 | Status: ON HOLD | OUTPATIENT
Start: 2017-02-09 | End: 2018-03-15

## 2017-02-09 RX ORDER — DEXAMETHASONE SODIUM PHOSPHATE 4 MG/ML
INJECTION, SOLUTION INTRA-ARTICULAR; INTRALESIONAL; INTRAMUSCULAR; INTRAVENOUS; SOFT TISSUE PRN
Status: DISCONTINUED | OUTPATIENT
Start: 2017-02-09 | End: 2017-02-09

## 2017-02-09 RX ORDER — AMPICILLIN 2 G/1
2 INJECTION, POWDER, FOR SOLUTION INTRAVENOUS ONCE
Status: COMPLETED | OUTPATIENT
Start: 2017-02-09 | End: 2017-02-09

## 2017-02-09 RX ORDER — ONDANSETRON 2 MG/ML
INJECTION INTRAMUSCULAR; INTRAVENOUS PRN
Status: DISCONTINUED | OUTPATIENT
Start: 2017-02-09 | End: 2017-02-09

## 2017-02-09 RX ORDER — OXYCODONE HYDROCHLORIDE 5 MG/1
5-10 TABLET ORAL EVERY 4 HOURS PRN
Qty: 30 TABLET | Refills: 0 | Status: SHIPPED | OUTPATIENT
Start: 2017-02-09 | End: 2017-03-28

## 2017-02-09 RX ADMIN — DEXAMETHASONE SODIUM PHOSPHATE 4 MG: 4 INJECTION, SOLUTION INTRAMUSCULAR; INTRAVENOUS at 12:05

## 2017-02-09 RX ADMIN — Medication 10 MG: at 12:15

## 2017-02-09 RX ADMIN — FENTANYL CITRATE 50 MCG: 50 INJECTION, SOLUTION INTRAMUSCULAR; INTRAVENOUS at 14:33

## 2017-02-09 RX ADMIN — GENTAMICIN SULFATE 250 MG: 40 INJECTION, SOLUTION INTRAMUSCULAR; INTRAVENOUS at 11:52

## 2017-02-09 RX ADMIN — FENTANYL CITRATE 100 MCG: 50 INJECTION, SOLUTION INTRAMUSCULAR; INTRAVENOUS at 11:49

## 2017-02-09 RX ADMIN — FENTANYL CITRATE 25 MCG: 50 INJECTION, SOLUTION INTRAMUSCULAR; INTRAVENOUS at 13:53

## 2017-02-09 RX ADMIN — FENTANYL CITRATE 150 MCG: 50 INJECTION, SOLUTION INTRAMUSCULAR; INTRAVENOUS at 12:40

## 2017-02-09 RX ADMIN — PHENYLEPHRINE HYDROCHLORIDE 100 MCG: 10 INJECTION, SOLUTION INTRAMUSCULAR; INTRAVENOUS; SUBCUTANEOUS at 12:15

## 2017-02-09 RX ADMIN — METOPROLOL TARTRATE 0.5 MG: 5 INJECTION INTRAVENOUS at 12:33

## 2017-02-09 RX ADMIN — Medication 50 MG: at 11:49

## 2017-02-09 RX ADMIN — Medication 2 TABLET: at 14:34

## 2017-02-09 RX ADMIN — Medication 20 MG: at 12:42

## 2017-02-09 RX ADMIN — FENTANYL CITRATE 100 MCG: 50 INJECTION, SOLUTION INTRAMUSCULAR; INTRAVENOUS at 12:10

## 2017-02-09 RX ADMIN — FENTANYL CITRATE 50 MCG: 50 INJECTION, SOLUTION INTRAMUSCULAR; INTRAVENOUS at 14:44

## 2017-02-09 RX ADMIN — ONDANSETRON 4 MG: 2 INJECTION INTRAMUSCULAR; INTRAVENOUS at 15:10

## 2017-02-09 RX ADMIN — SODIUM CHLORIDE, POTASSIUM CHLORIDE, SODIUM LACTATE AND CALCIUM CHLORIDE: 600; 310; 30; 20 INJECTION, SOLUTION INTRAVENOUS at 11:44

## 2017-02-09 RX ADMIN — FENTANYL CITRATE 25 MCG: 50 INJECTION, SOLUTION INTRAMUSCULAR; INTRAVENOUS at 12:38

## 2017-02-09 RX ADMIN — FENTANYL CITRATE 25 MCG: 50 INJECTION, SOLUTION INTRAMUSCULAR; INTRAVENOUS at 14:05

## 2017-02-09 RX ADMIN — Medication 2 TABLET: at 16:18

## 2017-02-09 RX ADMIN — SUGAMMADEX 160 MG: 100 INJECTION, SOLUTION INTRAVENOUS at 13:33

## 2017-02-09 RX ADMIN — AMPICILLIN SODIUM 2 G: 2 INJECTION, POWDER, FOR SOLUTION INTRAMUSCULAR; INTRAVENOUS at 12:10

## 2017-02-09 RX ADMIN — ONDANSETRON 4 MG: 2 INJECTION INTRAMUSCULAR; INTRAVENOUS at 13:00

## 2017-02-09 RX ADMIN — FENTANYL CITRATE 50 MCG: 50 INJECTION, SOLUTION INTRAMUSCULAR; INTRAVENOUS at 14:16

## 2017-02-09 NOTE — PROGRESS NOTES
UROLOGY FOLLOW-UP NOTE          Chief Complaint:   Today I had the pleasure of seeing Ms. Sophia Andrew in follow-up for a chief complaint of chronic left flank pain        Interval Update    Sophia Andrew is a very pleasant 56 year old female last seen 2 months ago.  At our last visit she noted persistent and worsening left flank pain.  THis has been ongoing for several years.  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  At last visit we discussed repeat attempt at left percutaneous stone removal as a last resort if no other etiology could be identified for her pain.  She has since been treated with antibiotics with no improvement.  A renal scan was performed given known history of left ureteral stricture with no evidence of obstruction. Since last being seen she also completed a 24 hour urine collection (known history of brushite stones).          Physical Exam:   Patient is a 56 year old  female   Vitals: Blood pressure 150/89, pulse 80, weight 80.74 kg (178 lb), not currently breastfeeding.  General Appearance Adult: Alert, no acute distress, oriented  HENT: throat/mouth:normal, good dentition  Neck: No adenopathy,masses or thyromegaly  Lungs: no respiratory distress, or pursed lip breathing  Heart: No obvious jugular venous distension present  Abdomen: soft, nontender, no organomegaly or masses, Body mass index is 31.54 kg/(m^2).  Lymphatics: No cervical or supraclavicular adenopathy  Musculoskeltal: extremities normal, no peripheral edema  Skin: no suspicious lesions or rashes  Neuro: Alert, oriented, speech and mentation normal  Psych: affect and mood normal  Gait: Normal      Labs and Pathology:    I personally reviewed all applicable laboratory data and went over findings with patient  Significant for:    CBC RESULTS:  Recent Labs   Lab Test  01/19/17   1602  08/09/16   1118  08/01/16   1256  06/18/16   1059    WBC  10.6  13.4*  10.6  10.2   HGB  11.9  11.3*  11.9  12.2   PLT  341  516*  379  338        BMP RESULTS:  Recent Labs   Lab Test  01/19/17   1602  08/09/16   1118  08/01/16   1256  06/18/16   1059   NA  143  140  138  140   POTASSIUM  4.0  4.2  3.8  4.1   CHLORIDE  108  106  106  111*   CO2  25  28  26  27   ANIONGAP  10  6  5  2*   GLC  114*  96  102*  83   BUN  10  14  15  11   CR  0.73  0.76  0.84  0.79   GFRESTIMATED  82  79  71  75   GFRESTBLACK  >90   GFR Calc    >90   GFR Calc    86  >90   GFR Calc     FARSHAD  8.7  9.4  8.8  9.0       UA RESULTS:   Recent Labs   Lab Test  01/19/17   1607  08/09/16   0816  08/01/16   1327   SG  1.024  1.017  1.016   URINEPH  6.0  6.0  6.0   NITRITE  Negative  Negative  Negative   RBCU  53*  2  2   WBCU  1  14*  10*     12/16 - 24 hour urine, notable for severe low urine volume (0.9 L) as well as hypercalciuria (245)      Imaging:    I personally reviewed all applicable imaging and went over findings with patient.  Significant for   Renal Scan 12/15/16 -   There is normal and symmetric perfusion of both kidneys. The split  functional measurements are: 46.3% function on the left as compared to  53.7% function on the right.     The excretion curves demonstrates prompt uptake and excretion from  both kidneys which appear symmetrically. After Lasix administration  there was further washout from the kidneys and there is no  hydronephrosis.                                                                       Impression:     Normal Lasix renogram with without evidence of hydronephrosis or  Obstruction.    KUB 12/6/16   IMPRESSION:  1.  Left nephrolithiasis, without definitive change from the CT on  10/4/2016.  2.  Nonobstructive bowel gas pattern.    CT 10/4/2016  Impression:    1. Interval resolution of obstructing left ureteral stone as well as  associated hydroureteronephrosis.  2. Decrease in stone burden of the left kidney.  Residual  nonobstructive stones can be seen in the mid left kidney as described  above.         Past Medical History:     Past Medical History   Diagnosis Date     Hypertension      Kidney stones 2011     nephrostomy tube            Past Surgical History:     Past Surgical History   Procedure Laterality Date     Hysterectomy       Extracorporeal shock wave lithotripsy (eswl)       right kidney      section  X2     Percutaneous nephrolithotomy  2011     Procedure:PERCUTANEOUS NEPHROLITHOTOMY; Surgeon:MAYTE BOWERS; [facility]     Cystoscopy, ureteroscopy, combined  2011     Procedure:COMBINED CYSTOSCOPY, URETEROSCOPY; Holmium Laser Lithotripsy ; Surgeon:MAYTE BOWERS; [facility]      section       x2     Laser holmium nephrolithotomy via percutaneous nephrostomy  10/20/2011     Procedure:LASER HOLMIUM NEPHROLITHOTOMY VIA PERCUTANEOUS NEPHROSTOMY; Left Holmium Laser of a Caliceal Diverticulum, Stent Placement with Balloon Dilatation, Ureteroscopy, Cystoscopy.; Surgeon:MAYTE BOWERS; [facility]     Laser holmium lithotripsy ureter(s), insert stent, combined  2011     Procedure:COMBINED CYSTOSCOPY, URETEROSCOPY, LASER HOLMIUM LITHOTRIPSY URETER(S), INSERT STENT; Surgeon:MAYTE BOWERS; [facility]     Laser holmium nephrolithotomy via percutaneous nephrostomy  2011     Procedure:LASER HOLMIUM NEPHROLITHOTOMY VIA PERCUTANEOUS NEPHROSTOMY; Ureteroscopy, retrograde pylogram, left stent placement.  laser not used, laser setup and not used.; Surgeon:MAYTE BOWERS; [facility]     Combined cystoscopy, insert stent ureter(s)  1/15/2014     Procedure: COMBINED CYSTOSCOPY, INSERT STENT URETER(S);  Cystoscopy, Right ureteral stent placement;  Surgeon: Patience Coy MD;  [facility]     Laser holmium lithotripsy ureter(s), insert stent, combined  2014     Procedure: COMBINED CYSTOSCOPY, URETEROSCOPY, LASER HOLMIUM  LITHOTRIPSY URETER(S), INSERT STENT;  Cystoscopy, Right Ureteroscopy With Holmium Laser, Right Stent Placement  ;  Surgeon: Yaakov Purcell MD;  [facility]     Combined cystoscopy, retrogrades, ureteroscopy, laser holmium lithotripsy ureter(s), insert stent Left 6/4/2015     Procedure: COMBINED CYSTOSCOPY, RETROGRADES, URETEROSCOPY, LASER HOLMIUM LITHOTRIPSY URETER(S), INSERT STENT;  Surgeon: Yaakov Purcell MD;  [facility]     Laser holmium lithotripsy ureter(s), insert stent, combined Left 6/17/2015     Procedure: COMBINED CYSTOSCOPY, URETEROSCOPY, LASER HOLMIUM LITHOTRIPSY URETER(S), INSERT STENT;  Surgeon: Yaakov Purcell MD;  [facility]     Combined cystoscopy, insert stent ureter(s) Left 7/19/2015     Procedure: COMBINED CYSTOSCOPY, INSERT STENT URETER(S);  Surgeon: Marisol Grubbs MD;  [facility]     Gyn surgery       Laser holmium lithotripsy ureter(s), insert stent, combined Bilateral 8/6/2015     Procedure: COMBINED CYSTOSCOPY, URETEROSCOPY, LASER HOLMIUM LITHOTRIPSY URETER(S), INSERT STENT;  Surgeon: Yaakov Purcell MD;  [facility]     Laser holmium lithotripsy ureter(s), insert stent, combined Left 8/19/2015     Procedure: COMBINED CYSTOSCOPY, URETEROSCOPY, LASER HOLMIUM LITHOTRIPSY URETER(S), INSERT STENT;  Surgeon: Yaakov Purcell MD;  [facility]     Combined cystoscopy, retrogrades, ureteroscopy, laser holmium lithotripsy ureter(s), insert stent Left 9/9/2015     Procedure: COMBINED CYSTOSCOPY, RETROGRADES, URETEROSCOPY, LASER HOLMIUM LITHOTRIPSY URETER(S), INSERT STENT;  Surgeon: Yaakov Purcell MD;  [facility]     Cystoscopy, retrogrades, insert stent ureter(s), combined Left 8/9/2016     Procedure: COMBINED CYSTOSCOPY, RETROGRADES, INSERT STENT URETER(S);  Surgeon: Daniel Galeano MD;  [facility]     Laser holmium lithotripsy ureter(s), insert stent, combined Left 8/18/2016     Procedure: COMBINED CYSTOSCOPY,  URETEROSCOPY, LASER HOLMIUM LITHOTRIPSY URETER(S), INSERT STENT;  Surgeon: Daniel Galeano MD;  [facility]            Medications     Current Outpatient Prescriptions   Medication     ciprofloxacin (CIPRO) 500 MG tablet     ibuprofen (ADVIL,MOTRIN) 600 MG tablet     No current facility-administered medications for this visit.            Family History:     Family History   Problem Relation Age of Onset     DIABETES Sister      DIABETES Mother      DIABETES Other      DIABETES Maternal Grandmother      DIABETES Maternal Grandfather      Prostate Cancer Father      Thyroid Disease Mother      Coronary Artery Disease No family hx of      Hypertension No family hx of      Hyperlipidemia No family hx of      CEREBROVASCULAR DISEASE No family hx of      Breast Cancer No family hx of      Colon Cancer No family hx of      Other Cancer No family hx of      Depression No family hx of      Anxiety Disorder No family hx of      MENTAL ILLNESS No family hx of      Substance Abuse No family hx of      Anesthesia Reaction No family hx of      Asthma No family hx of      OSTEOPOROSIS No family hx of      Genetic Disorder No family hx of      Obesity No family hx of             Social History:     Social History     Social History     Marital Status: Single     Spouse Name: N/A     Number of Children: N/A     Years of Education: N/A     Occupational History     Not on file.     Social History Main Topics     Smoking status: Former Smoker -- 0.50 packs/day for 40 years     Types: Cigarettes     Quit date: 2016     Smokeless tobacco: Never Used     Alcohol Use: No     Drug Use: No     Sexual Activity: No     Other Topics Concern     Not on file     Social History Narrative    Single.  Engaged.      Works at [employer]    2 children - 1 son with hypothyroid    7 siblings - 1 sister with history of CVA.          Mother :  with history of blood clots., diabetes mellitus, hypertension, heart disease    Father living  (hx of cancer)        No family history of bleeding, clotting disorders or complications with anesthesia.                Allergies:   Codeine sulfate; Hydromorphone; and Pollen extract         Review of Systems:  From intake questionnaire   Negative 14 system review except as noted on HPI, nurse's note.           Assessment/Plan   57 yo F with persistant left flank pain and known residual stone in calyceal diverticulum  -Readressed treatment options for left flank pain.  At this point suspect stone in diverticulum may be contributing though explained that this is not definite and would be impossible to know without stone removal.  Discussed that redo calyceal diverticulectomy is technically challenging procedure in particular because the stone is small (sub-cm).  She is aware that complete stone removal/fulguration of tic cavity may not be feasible but would like to proceed as she is quite frustrated by her chronic left flnak pain which she says is preventing her from living her normal life.  -Reviewed risks of procedure including bleeding, infection, damage to adjacent structures, incomplete stone removal, need for staged procedure, need for post-op neph tube  -Is on prophylactic cipro for mixed alan on urine culture, though UA with 1 WBC, suspect contmainant.  -Proceed with left PCNL, possible fulguration of calyceal diverticulum  -In terms of stone prevention, main risk is dehydration.  Reviewed strategies to increase fluid intake to 3L/day.  -Also discussed starting thiazide to decrease urinary calcium.  Will readress this post procedure.    Daniel Galeano MD

## 2017-02-09 NOTE — ANESTHESIA CARE TRANSFER NOTE
Patient: Sophia Andrew    Procedure(s):  Cystoscopy, Retrogrades, Left ureteroscopy, Holmium Laser Infundibulotomy and left ureteral stent placement - Wound Class: II-Clean Contaminated    Diagnosis: Left renal Stone, Calculus Of Kidney   Diagnosis Additional Information: No value filed.    Anesthesia Type:   General, ETT     Note:  Airway :Face Mask  Patient transferred to:PACU        Vitals: (Last set prior to Anesthesia Care Transfer)    CRNA VITALS  2/9/2017 1309 - 2/9/2017 1347      2/9/2017             Pulse: 90    SpO2: 100 %    Resp Rate (observed): (!) 2    Resp Rate (set): 8                Electronically Signed By: JORJE Orlando CRNA  February 9, 2017  1:47 PM

## 2017-02-09 NOTE — DISCHARGE INSTRUCTIONS
Same-Day Surgery   Adult Discharge Orders & Instructions     For 24 hours after surgery:  1. Get plenty of rest.  A responsible adult must stay with you for at least 24 hours after you leave the hospital.   2. Pain medication can slow your reflexes. Do not drive or use heavy equipment.  If you have weakness or tingling, don't drive or use heavy equipment until this feeling goes away.  3. Mixing alcohol and pain medication can cause dizziness and slow your breathing. It can even be fatal. Do not drink alcohol while taking pain medication.  4. Avoid strenuous or risky activities.  Ask for help when climbing stairs.   5. You may feel lightheaded.  If so, sit for a few minutes before standing.  Have someone help you get up.   6. If you have nausea (feel sick to your stomach), drink only clear liquids such as apple juice, ginger ale, broth or 7-Up.  Rest may also help.  Be sure to drink enough fluids.  Move to a regular diet as you feel able. Take pain medications with a small amount of solid food, such as toast or crackers, to avoid nausea.   7. A slight fever is normal. Call the doctor if your fever is over 100 F (37.7 C) (taken under the tongue) or lasts longer than 24 hours.  8. You may have a dry mouth, muscle aches, trouble sleeping or a sore throat.  These symptoms should go away after 24 hours.  9. Do not make important or legal decisions.   Pain Management:      1. Take pain medication (if prescribed) for pain as directed by your physician.        2. WARNING: If the pain medication you have been prescribed contains Tylenol  (acetaminophen), DO NOT take additional doses of Tylenol (acetaminophen).     Call your doctor for any of the followin.  Signs of infection (fever, growing tenderness at the surgery site, severe pain, a large amount of drainage or bleeding, foul-smelling drainage, redness, swelling).    2.  It has been over 8 to 10 hours since surgery and you are still not able to urinate (pee).    3.   Headache for over 24 hours.    4.  Numbness, tingling or weakness the day after surgery (if you had spinal anesthesia).  To contact a doctor, call _____________________________________ or:      [phone] and ask for the Resident On Call for:          __________________________________________ (answered 24 hours a day)      Emergency Department:  [place] [facility]: [phone]  [place] [facility]: [phone]  [facility]: [phone]             Rev. 10/2014

## 2017-02-09 NOTE — BRIEF OP NOTE
[facility], [place]    Brief Operative Note    Pre-operative diagnosis: Left renal Stone, Calculus Of Kidney   Post-operative diagnosis Same  Procedure: Procedure(s):  Cystoscopy, Retrogrades, Left ureteroscopy, Holmium Laser Infundibulotomy and left ureteral stent placement - Wound Class: II-Clean Contaminated  Surgeon: Surgeon(s) and Role:     * Daniel Galeano MD - Primary     * Victorino Cruz MD - Resident - Assisting  Anesthesia: * No anesthesia type entered *   Estimated blood loss: Minimal  Drains: 8 Fr x 24 cm left ureteral stent  Specimens: None  Findings:   Unable to visualize stones on fluoroscopic imaging, so unable to target stones for percutaneous access. On ureteroscopy, midpole hydrocalyx with scarred opening identified. Entrance to hydrocalyx opened with laser infundibulotomy and stent placed across opening into hydrocalyx. .  Complications: None.  Implants: None.

## 2017-02-09 NOTE — OR NURSING
2 additional oxycodone given. Patient had large emesis of 500 cc shortly after. Will wait to take pills when she gets home. Sea bands placed and aroma therapy given .

## 2017-02-09 NOTE — IP AVS SNAPSHOT
MRN:[id number]                      After Visit Summary   2/9/2017    Sophia Andrew    MRN: [id number]           Thank you!     Thank you for choosing [facility] for your care. Our goal is always to provide you with excellent care. Hearing back from our patients is one way we can continue to improve our services. Please take a few minutes to complete the written survey that you may receive in the mail after you visit with us. Thank you!        Patient Information     Date Of Birth          1960        About your hospital stay     You were admitted on:  February 9, 2017 You last received care in the:  [facility] PACU    You were discharged on:  February 9, 2017       Who to Call     For medical emergencies, please call 911.  For non-urgent questions about your medical care, please call your primary care provider or clinic, None  For questions related to your surgery, please call your surgery clinic        Attending Provider     Provider    Daniel Galeano MD       Primary Care Provider Fax #    [facility] [phone] [address] [facility] [address]        After Care Instructions     Discharge Instructions       Activity  - Do not strain with bowel movements.  - Do not drive until you can press the brake pedal quickly and fully without pain.   - Do not operate a motor vehicle while taking narcotic pain medications.     Stents  - You have a left ureteral stent in place. Stents can cause some discomfort, including some blood in your urine (which is normal), the feeling or urge to urinate frequently, and pressure/discomfort in your back while voiding. Stay well hydrated to keep your urine as clear as possible.    Medications  - Transition from narcotic pain medications to tylenol (acetaminophen) as you are able.  Wean yourself off all pain medications as you are able.  - Some pain medications contain both tylenol (acetaminophen) and a narcotic (Norco, vicodin,  "percocet), do not take more than 4,000mg of Tylenol (acetaminophen) from all sources in any 24 hour period.  - Narcotics can make you constipated.  Take over the counter fiber (metamucil or benefiber) and stool softeners (miralax, docusate or senna) while taking narcotic pain medications, but stop if you develop diarrhea.  - No driving or operating machinery while taking narcotic pain medications     Follow-Up:  - Follow up with Dr. Galeano in 6 weeks for stent removal.  - Call your primary care provider to touch base regarding your recent procedure.   - Call or return sooner than your regularly scheduled visit if you develop any of the following: fever (greater than 101.5), uncontrolled pain, uncontrolled nausea or vomiting, as well as increased redness, swelling, or drainage from your wound.     Phone numbers:   - Monday through Friday 8am to 4:30pm: Call [phone] with questions or to schedule or confirm appointment.    - Nights or weekends: call the after hours emergency pager - [phone] and tell the  \"I would like to page the Urology Resident on call.\"  - For emergencies, call [phone]                  Further instructions from your care team       Same-Day Surgery   Adult Discharge Orders & Instructions     For 24 hours after surgery:  1. Get plenty of rest.  A responsible adult must stay with you for at least 24 hours after you leave the hospital.   2. Pain medication can slow your reflexes. Do not drive or use heavy equipment.  If you have weakness or tingling, don't drive or use heavy equipment until this feeling goes away.  3. Mixing alcohol and pain medication can cause dizziness and slow your breathing. It can even be fatal. Do not drink alcohol while taking pain medication.  4. Avoid strenuous or risky activities.  Ask for help when climbing stairs.   5. You may feel lightheaded.  If so, sit for a few minutes before standing.  Have someone help you get up.   6. If you have nausea (feel sick " to your stomach), drink only clear liquids such as apple juice, ginger ale, broth or 7-Up.  Rest may also help.  Be sure to drink enough fluids.  Move to a regular diet as you feel able. Take pain medications with a small amount of solid food, such as toast or crackers, to avoid nausea.   7. A slight fever is normal. Call the doctor if your fever is over 100 F (37.7 C) (taken under the tongue) or lasts longer than 24 hours.  8. You may have a dry mouth, muscle aches, trouble sleeping or a sore throat.  These symptoms should go away after 24 hours.  9. Do not make important or legal decisions.   Pain Management:      1. Take pain medication (if prescribed) for pain as directed by your physician.        2. WARNING: If the pain medication you have been prescribed contains Tylenol  (acetaminophen), DO NOT take additional doses of Tylenol (acetaminophen).     Call your doctor for any of the followin.  Signs of infection (fever, growing tenderness at the surgery site, severe pain, a large amount of drainage or bleeding, foul-smelling drainage, redness, swelling).    2.  It has been over 8 to 10 hours since surgery and you are still not able to urinate (pee).    3.  Headache for over 24 hours.    4.  Numbness, tingling or weakness the day after surgery (if you had spinal anesthesia).  To contact a doctor, call _____________________________________ or:      [phone] and ask for the Resident On Call for:          __________________________________________ (answered 24 hours a day)      Emergency Department:  [place] [facility]: [phone]  [place] [facility]: [phone]  [facility] Emergency Department: [phone]             Rev. 10/2014     Additional Information     If you use hormonal birth control (such as the pill, patch, ring or implants): You'll need a second form of birth control for 7 days (condoms, a diaphragm or contraceptive foam). While in the  "hospital, you received a medicine called Bridion. Your normal birth control will not work as well for a week after taking this medicine.          Pending Results     No orders found from 2/8/2017 to 2/10/2017.            Admission Information        Provider Department Dept Phone    2/9/2017 Daniel Galeano MD [facility] [phone]      Your Vitals Were     Blood Pressure Temperature Respirations    143/87 mmHg 97.5  F (36.4  C) (Axillary) 22    Height Weight BMI (Body Mass Index)    1.6 m (5' 3\") 80 kg (176 lb 5.9 oz) 31.25 kg/m2    Pulse Oximetry          96%        MyChart Information     [facility] gives you secure access to your electronic health record. If you see a primary care provider, you can also send messages to your care team and make appointments. If you have questions, please call your primary care clinic.  If you do not have a primary care provider, please call [phone] and they will assist you.        Care EveryWhere ID     This is your Care EveryWhere ID. This could be used by other organizations to access your [facility] medical records  [id number]           Review of your medicines      START taking        Dose / Directions    oxyCODONE 5 MG IR tablet   Commonly known as:  ROXICODONE   Used for:  Nephrolithiasis        Dose:  5-10 mg   Take 1-2 tablets (5-10 mg) by mouth every 4 hours as needed for other (Moderate to Severe Pain)   Quantity:  30 tablet   Refills:  0       senna-docusate 8.6-50 MG per tablet   Commonly known as:  SENOKOT-S;PERICOLACE   Used for:  Nephrolithiasis        Dose:  1-2 tablet   Take 1-2 tablets by mouth 2 times daily as needed for constipation   Quantity:  30 tablet   Refills:  0       tamsulosin 0.4 MG capsule   Commonly known as:  FLOMAX   Used for:  Nephrolithiasis        Dose:  0.4 mg   Take 1 capsule (0.4 mg) by mouth daily For stent related discomfort   Quantity:  50 capsule   Refills:  0         CONTINUE these medicines which have NOT CHANGED        " Dose / Directions    ciprofloxacin 500 MG tablet   Commonly known as:  CIPRO   Used for:  Personal history of urinary tract infection        Dose:  500 mg   Take 1 tablet (500 mg) by mouth 2 times daily for 7 days   Quantity:  14 tablet   Refills:  0       ibuprofen 600 MG tablet   Commonly known as:  ADVIL/MOTRIN   Used for:  Calculus of kidney        Dose:  600 mg   Take 1 tablet (600 mg) by mouth every 6 hours as needed for moderate pain   Quantity:  30 tablet   Refills:  1            Where to get your medicines      These medications were sent to [facility] [place], [place] - [address]  [address] [address]     Phone:  [phone]    - ciprofloxacin 500 MG tablet  - senna-docusate 8.6-50 MG per tablet  - tamsulosin 0.4 MG capsule      Some of these will need a paper prescription and others can be bought over the counter. Ask your nurse if you have questions.     Bring a paper prescription for each of these medications    - oxyCODONE 5 MG IR tablet             Protect others around you: Learn how to safely use, store and throw away your medicines at www.disposemymeds.org.             Medication List: This is a list of all your medications and when to take them. Check marks below indicate your daily home schedule. Keep this list as a reference.      Medications           Morning Afternoon Evening Bedtime As Needed    ciprofloxacin 500 MG tablet   Commonly known as:  CIPRO   Take 1 tablet (500 mg) by mouth 2 times daily for 7 days                                ibuprofen 600 MG tablet   Commonly known as:  ADVIL/MOTRIN   Take 1 tablet (600 mg) by mouth every 6 hours as needed for moderate pain                                oxyCODONE 5 MG IR tablet   Commonly known as:  ROXICODONE   Take 1-2 tablets (5-10 mg) by mouth every 4 hours as needed for other (Moderate to Severe Pain)                                senna-docusate 8.6-50 MG per tablet   Commonly known as:   SENOKOT-S;PERICOLACE   Take 1-2 tablets by mouth 2 times daily as needed for constipation                                tamsulosin 0.4 MG capsule   Commonly known as:  FLOMAX   Take 1 capsule (0.4 mg) by mouth daily For stent related discomfort

## 2017-02-09 NOTE — OP NOTE
DATE OF PROCEDURE:  02/09/2017.      PREOPERATIVE DIAGNOSIS:  Left renal stone.      POSTOPERATIVE DIAGNOSIS:  Left renal hydro kamilah.      PROCEDURES:   1.  Cystoscopy.   2.  Left diagnostic ureteroscopy.   3.  Left laser infundibulotomy of stenotic infundibulum and placement of left ureteral stent, left retrograde pyelogram with interpretation of intraoperative images.      SURGEON:  Daniel Galeano MD      ASSISTANT SURGEON:  Victorino Cruz MD      ANESTHESIA:  General.      ESTIMATED BLOOD LOSS:  Less than 5 mL.      IV FLUIDS:  As per Anesthesia record.      SPECIMENS:  None.      DRAINS:  8 x 24 double-J ureteral stent.      COMPLICATIONS:  None.      DISPOSITION:  Stable to recovery room.      FINDINGS:  Narrow infundibulum leading to midpole hydro kamilah.  No evidence of any intrarenal stones, no radiopaque renal calculi.      INDICATIONS FOR PROCEDURE:  Ms. Sophia Andrew is a very pleasant 56-year-old female with a history of recurrent brushite stones.  She has a history of left midpole caliceal diverticulum which was attempted to be treated percutaneously as well as ureteroscopically 5-6 years ago.  She has had left flank pain for many years.  Recently, she underwent a diagnostic and therapeutic ureteroscopy where numerous brushite stones were removed; however, there was 1 residual stone that could not be identified that appears to be within the parenchyma of the left kidney on postoperative imaging.  The patient had a renal scan confirming no evidence of ureteral obstruction in the setting of a known distal ureteral stricture.  She also has been counseled regarding treatment options.  She is very bothered by her persistent pain in her left kidney, which to this point we have not been able to explain, aside from the possibility of some obscure intrarenal anatomy, such as a caliceal diverticulum or the stone, itself.  The patient has elected to proceed with an attempt at left percutaneous nephrolithotomy  with possible fulguration of caliceal diverticulum for which she presents today.  The stone has been deemed to be radiopaque on preoperative KUB, however does have a relatively low Hounsfield unit in the 500-600 range.      DESCRIPTION OF PROCEDURE:  After obtaining informed consent from the patient, the patient was taken to the operating room suite and positioned supine on the table.  She was induced with general anesthesia and repositioned in dorsal lithotomy.  She was prepped and draped in standard sterile fashion and a timeout was performed, confirming the appropriate patient identity and planned procedure.  We began the procedure after administration of broad-spectrum antibiotics and application of bilateral sequential compression devices.  We inserted the 21-Telugu rigid cystoscope into the bladder and inspected the bladder, which was anatomically normal.  We identified the left ureteral orifice and passed a Sensor wire through it up into the left kidney.  On fluoroscopy, we could not identify any calcification whatsoever.  To ensure that we were within the kidney, we did perform a gentle retrograde pyelogram, which helped delineate the collecting system, and we could still not see any stones, despite high-magnification fluoroscopy.  In the absence of a radiopaque stone, a percutaneous nephrolithotomy would not be feasible.  Given her prior history of stone and calyceal diverticulum, we did elect to interrogate the left kidney with an ureteroscope.  We attempted to place the scope up over our wire; however, it would not pass beyond her known distal ureteral narrowing.  We subsequently used the sequential ureteral dilators in order to gently dilate the ureter to 12-Telugu, such that we could get a 9/11 x 36 cm ureteral access sheath up into the left kidney.  This was ultimately able to be passed under fluoroscopic guidance into the midureter.  We went in with the flexible scope and inspected the calices.  There  was no evidence of any intrarenal stone.  In the area of the stone along the midpole, we did identify one small area of what appeared to be calcification on high-definition fluoroscopy, though it was quite faint.  On endoscopy, we identified a stenotic infundibulum which initially we did not think we would be able to get our scope into; however, as we moved the tip of the ureteroscope around, we identified a narrow passage that we were ultimately able to work open.  This seemed to lead to a midpole obstructed infundibulum, presumably in the area where the patient had previously undergone stone treatment.  We elected to use the holmium laser in order to incise infundibulum and look for any stones behind it.  We subsequently introduced a 200 micron holmium laser and at a setting of 0.8 joules and 8 Hz performed a laser infundibulotomy down to the level of fat.  This allowed us to gain entry into the obstructed hydro kamilah.  Unfortunately, there was no evidence of any stone within it.  At this point, we again looked on fluoroscopy and could not confidently delineate a stone, thus precluding us from being able to perform an attempt at percutaneous stone removal; however, we did feel as though the hydro kamilah with the stenotic infundibulum could be a potential source of her recurrent pain.  As such, we placed a Sensor wire into the obstructed kamilah and placed an 8 x 24 double-J ureteral stent over a wire and into the kamilah such that the proximal curl was within the kamilah beyond the infundibulum and the distal curl was within the bladder.  We did not leave a string.  We will plan to remove it in 4-6 weeks.  We emptied the patient's bladder and awoke her from anesthesia.  We transferred her back to the stretcher in stable condition and she was taken to the recovery room.         IDA VIEYRA MD             D: 02/09/2017 13:50   T: 02/09/2017 14:28   MT: TD      Name:     ARABELLA DICKERSON   MRN:      [id number]         Account:        [id number]   :      1960           Procedure Date: 2017      Document: [id number]

## 2017-02-09 NOTE — IP AVS SNAPSHOT
[place] [address] [facility] [address]    Phone:  [phone]                                       After Visit Summary   2/9/2017    Sophia Andrew    MRN: [id number]           After Visit Summary Signature Page     I have received my discharge instructions, and my questions have been answered. I have discussed any challenges I see with this plan with the nurse or doctor.    ..........................................................................................................................................  Patient/Patient Representative Signature      ..........................................................................................................................................  Patient Representative Print Name and Relationship to Patient    ..................................................               ................................................  Date                                            Time    ..........................................................................................................................................  Reviewed by Signature/Title    ...................................................              ..............................................  Date                                                            Time

## 2017-02-09 NOTE — ANESTHESIA POSTPROCEDURE EVALUATION
Patient: Sophia Andrew    Procedure(s):  Cystoscopy, Retrogrades, Left ureteroscopy, Holmium Laser Infundibulotomy and left ureteral stent placement - Wound Class: II-Clean Contaminated    Diagnosis:Left renal Stone, Calculus Of Kidney   Diagnosis Additional Information: No value filed.    Anesthesia Type:  General, ETT    Note:  Anesthesia Post Evaluation    Patient location during evaluation: PACU  Patient participation: Able to fully participate in evaluation  Level of consciousness: awake and alert  Pain management: adequate  Airway patency: patent  Cardiovascular status: acceptable  Respiratory status: acceptable  Hydration status: acceptable  PONV: none     Anesthetic complications: None          Last vitals:  Filed Vitals:    02/09/17 1358 02/09/17 1400 02/09/17 1415   BP: 146/92 140/86 143/87   Temp:      Resp:   22   SpO2: 99% 98% 96%         Electronically Signed By: Katie Guillen MD  February 9, 2017  2:27 PM

## 2017-02-17 DIAGNOSIS — N20.0 CALCULUS OF KIDNEY: Primary | ICD-10-CM

## 2017-02-17 RX ORDER — TOLTERODINE 2 MG/1
2 CAPSULE, EXTENDED RELEASE ORAL DAILY
Qty: 30 CAPSULE | Refills: 0 | Status: SHIPPED | OUTPATIENT
Start: 2017-02-17 | End: 2017-03-28

## 2017-02-17 NOTE — PROGRESS NOTES
Pt called complaining of stent discomfort. Had CYSTOSCOPY, RETROGRADES, URETEROSCOPY, LASER HOLMIUM LITHOTRIPSY URETER(S), INSERT STENT on 2/9/17. Discussed with Dr. Galeano. Detrol LA sent to pts pharmacy. Pt informed and will  meds today.   Nisreen Quiroz RN

## 2017-02-20 ENCOUNTER — TELEPHONE (OUTPATIENT)
Dept: UROLOGY | Facility: CLINIC | Age: 57
End: 2017-02-20

## 2017-02-20 NOTE — TELEPHONE ENCOUNTER
Patient is having significant pain since having stents placed her pain level is constant at an 8  Message sent to dr ahn about plan of action.  Her follow up appointment is not till the end of next month. Yadira Cano LPN Staff Nurse

## 2017-02-28 ENCOUNTER — PRE VISIT (OUTPATIENT)
Dept: UROLOGY | Facility: CLINIC | Age: 57
End: 2017-02-28

## 2017-02-28 NOTE — TELEPHONE ENCOUNTER
Left diagnostic ureteroscopy.  Left laser infundibulotomy of stenotic infundibulum and placement of left ureteral stent, left retrograde pyelogram with interpretation of intraoperative images surgical follow up with cystoscopy with stent removal. Records in [facility]. No need to call patient

## 2017-03-07 ENCOUNTER — OFFICE VISIT (OUTPATIENT)
Dept: UROLOGY | Facility: CLINIC | Age: 57
End: 2017-03-07

## 2017-03-07 VITALS
SYSTOLIC BLOOD PRESSURE: 132 MMHG | HEART RATE: 91 BPM | WEIGHT: 176 LBS | HEIGHT: 63 IN | BODY MASS INDEX: 31.18 KG/M2 | DIASTOLIC BLOOD PRESSURE: 85 MMHG

## 2017-03-07 DIAGNOSIS — N13.30: Primary | ICD-10-CM

## 2017-03-07 ASSESSMENT — PAIN SCALES - GENERAL
PAINLEVEL: SEVERE PAIN (7)
PAINLEVEL: NO PAIN (0)

## 2017-03-07 NOTE — MR AVS SNAPSHOT
After Visit Summary   3/7/2017    Sophia Andrew    MRN: [id number]           Patient Information     Date Of Birth          1960        Visit Information        Provider Department      3/7/2017 11:00 AM Daniel Galeano MD [facility] Urology and [facility] for Prostate and Urologic Cancers        Care Instructions    Follow up in 6 weeks with Dr Galeano     It was a pleasure meeting with you today.  Thank you for allowing me and my team the privilege of caring for you today.  YOU are the reason we are here, and I truly hope we provided you with the excellent service you deserve.  Please let us know if there is anything else we can do for you so that we can be sure you are leaving completely satisfied with your care experience.                Follow-ups after your visit        Your next 10 appointments already scheduled     Mar 07, 2017 11:00 AM CST   Cystoscopy with Daniel Galeano MD   [facility] Urology and [facility] for Prostate and Urologic Cancers ([facility])    [address] [place] [address] [phone]            Apr 25, 2017  3:00 PM CDT   (Arrive by 2:45 PM)   Return Visit with Daniel Galeano MD   [facility] Urology and [facility] for Prostate and Urologic Cancers ([facility])    [address] [place] [address] [phone]              Who to contact     Please call your clinic at [phone] to:    Ask questions about your health    Make or cancel appointments    Discuss your medicines    Learn about your test results    Speak to your doctor   If you have compliments or concerns about an experience at your clinic, or if you wish to file a complaint, please contact [facility] Patient Relations at [phone] or email us at [email or web address]         Additional Information About Your Visit        MyChart Information     MyChart gives you  "secure access to your electronic health record. If you see a primary care provider, you can also send messages to your care team and make appointments. If you have questions, please call your primary care clinic.  If you do not have a primary care provider, please call [phone] and they will assist you.      [employer] is an electronic gateway that provides easy, online access to your medical records. With [employer], you can request a clinic appointment, read your test results, renew a prescription or communicate with your care team.     To access your existing account, please contact your [facility] or call [phone] for assistance.        Care EveryWhere ID     This is your Care EveryWhere ID. This could be used by other organizations to access your [facility] medical records  [id number]        Your Vitals Were     Pulse Height BMI (Body Mass Index)             91 1.6 m (5' 3\") 31.18 kg/m2          Blood Pressure from Last 3 Encounters:   03/07/17 132/85   02/09/17 118/87   02/07/17 150/89    Weight from Last 3 Encounters:   03/07/17 79.8 kg (176 lb)   02/09/17 80 kg (176 lb 5.9 oz)   02/07/17 80.7 kg (178 lb)              Today, you had the following     No orders found for display       Primary Care Provider Office Phone # Fax #    p [facility] [phone]       [address] [facility] [address]        Thank you!     Thank you for choosing [facility] UROLOGY AND [facility] FOR PROSTATE AND UROLOGIC CANCERS  for your care. Our goal is always to provide you with excellent care. Hearing back from our patients is one way we can continue to improve our services. Please take a few minutes to complete the written survey that you may receive in the mail after your visit with us. Thank you!             Your Updated Medication List - Protect others around you: Learn how to safely use, store and throw away your medicines at www.disposemymeds.org.          This " list is accurate as of: 3/7/17 10:46 AM.  Always use your most recent med list.                   Brand Name Dispense Instructions for use    ATORVASTATIN CALCIUM PO          ibuprofen 600 MG tablet    ADVIL/MOTRIN    30 tablet    Take 1 tablet (600 mg) by mouth every 6 hours as needed for moderate pain       LISINOPRIL PO          oxyCODONE 5 MG IR tablet    ROXICODONE    30 tablet    Take 1-2 tablets (5-10 mg) by mouth every 4 hours as needed for other (Moderate to Severe Pain)       senna-docusate 8.6-50 MG per tablet    SENOKOT-S;PERICOLACE    30 tablet    Take 1-2 tablets by mouth 2 times daily as needed for constipation       tamsulosin 0.4 MG capsule    FLOMAX    50 capsule    Take 1 capsule (0.4 mg) by mouth daily For stent related discomfort       tolterodine 2 MG 24 hr capsule    DETROL LA    30 capsule    Take 1 capsule (2 mg) by mouth daily

## 2017-03-07 NOTE — PATIENT INSTRUCTIONS
Follow up in 6 weeks with Dr Galeano     It was a pleasure meeting with you today.  Thank you for allowing me and my team the privilege of caring for you today.  YOU are the reason we are here, and I truly hope we provided you with the excellent service you deserve.  Please let us know if there is anything else we can do for you so that we can be sure you are leaving completely satisfied with your care experience.

## 2017-03-07 NOTE — NURSING NOTE
Chief Complaint   Patient presents with     Cystoscopy     stent removal     Invasive Procedure Safety Checklist:    Procedure: cysto    Action: Complete sections and checkboxes as appropriate.    Pre-procedure:  1. Patient ID Verified with 2 identifiers ([place]ali and  or MRN) : YES    2. Procedure and site verified with patient/designee (when able) : YES    3. Accurate consent documentation in medical record : YES    4. H&P (or appropriate assessment) documented in medical record : NO  H&P must be up to 30 days prior to procedure an updated within 24 hours of                 Procedure as applicable.     5. Relevant diagnostic and radiology test results appropriately labeled and displayed as applicable : NO    6. Blood products, implants, devices, and/or special equipment available for the procedure as applicable : NO    7. Procedure site(s) marked with provider initials [Exclusions: none] : NO    8. Marking not required. Reason : Yes  Procedure does not require site marking    Time Out:     Time-Out performed immediately prior to starting procedure, including verbal and active participation of all team members addressing: YES    1. Correct patient identity.  2. Confirmed that the correct side and site are marked.  3. An accurate procedure to be done.  4. Agreement on the procedure to be done.  5. Correct patient position.  6. Relevant images and results are properly labeled and appropriately displayed.  7. The need to administer antibiotics or fluids for irrigation purposes during the procedure as applicable.  8. Safety precautions based on patient history or medication use.    During Procedure: Verification of correct person, site, and procedure occurs any time the responsibility for care of the patient is transferred to another member of the care team.    Robb PERDOMO

## 2017-03-07 NOTE — LETTER
3/7/2017       RE: Sophia Andrew  [address]  [address]  [place] [address]     Dear Colleague,    Thank you for referring your patient, Sophia Andrew, to the [facility] UROLOGY AND [facility] FOR PROSTATE AND UROLOGIC CANCERS at [facility]. Please see a copy of my visit note below.    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  Taken to OR one month ago where stone was unable to be identified on fluoro and endoscopy.  Hydrocalyx with narrow infundibulum was identified in the area of the stone and was dilated and stented with 8F stent.  Patient has tolerated stent poorly but kept it in for nearly one month.  Here today for stent removal.    After obtaining informed consent and administering preoperative antibiotics the patient was prepped and draped in the standard sterile fashion.    The 17F flexible cystoscope was placed through the urethra and into the bladder.    The distal curl of the previously placed stent was identified and grasped with a flexible grasping forceps.     It was pulled from the kidney and out the bladder fully intact.    The patient tolerated the procedure well without complication.      Assessment/Plan: 55 yo F with left flank pain, nephrocalcinosis vs. Calyceal diverticulum and hydrocalyx  -Will return in 4-6 weeks for symptom check  -If pain worsened will consider CT urogram to delineate calyceal diverticulum vs. Nephrocalcinosis.  Have discussed this case with Dr. Bell in IR, as last resort could try PCNL in IR where fluoro machine is higher resolution and would have option for CT guided access if difficulty visualizing stone      Daniel Galeano

## 2017-03-07 NOTE — PROGRESS NOTES
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  Taken to OR one month ago where stone was unable to be identified on fluoro and endoscopy.  Hydrocalyx with narrow infundibulum was identified in the area of the stone and was dilated and stented with 8F stent.  Patient has tolerated stent poorly but kept it in for nearly one month.  Here today for stent removal.    After obtaining informed consent and administering preoperative antibiotics the patient was prepped and draped in the standard sterile fashion.    The 17F flexible cystoscope was placed through the urethra and into the bladder.    The distal curl of the previously placed stent was identified and grasped with a flexible grasping forceps.     It was pulled from the kidney and out the bladder fully intact.    The patient tolerated the procedure well without complication.      Assessment/Plan: 57 yo F with left flank pain, nephrocalcinosis vs. Calyceal diverticulum and hydrocalyx  -Will return in 4-6 weeks for symptom check  -If pain worsened will consider CT urogram to delineate calyceal diverticulum vs. Nephrocalcinosis.  Have discussed this case with Dr. Bell in IR, as last resort could try PCNL in IR where fluoro machine is higher resolution and would have option for CT guided access if difficulty visualizing stone      Daniel Galeano

## 2017-03-10 ENCOUNTER — TELEPHONE (OUTPATIENT)
Dept: UROLOGY | Facility: CLINIC | Age: 57
End: 2017-03-10

## 2017-03-10 DIAGNOSIS — R10.9 FLANK PAIN: Primary | ICD-10-CM

## 2017-03-10 RX ORDER — OXYCODONE HYDROCHLORIDE 5 MG/1
TABLET ORAL
Qty: 30 TABLET | Refills: 0 | Status: SHIPPED | OUTPATIENT
Start: 2017-03-10 | End: 2017-03-28

## 2017-03-10 NOTE — TELEPHONE ENCOUNTER
Sophia Andrew contacted Urology triage today regarding flank pain.  She had her stent removed on Tuesday and continues to have the pain as of today.  Spoke with Dr. Galeano who suggested to have a renal ultrasound and to give her pain meds.  Spoke with patient, she agrees with plan.  She will pick script up downstairs and schedulers will contact her to set up renal us.

## 2017-03-15 ENCOUNTER — TELEPHONE (OUTPATIENT)
Dept: UROLOGY | Facility: CLINIC | Age: 57
End: 2017-03-15

## 2017-03-15 DIAGNOSIS — N20.0 CALCULUS OF KIDNEY: ICD-10-CM

## 2017-03-15 DIAGNOSIS — R10.9 FLANK PAIN: ICD-10-CM

## 2017-03-15 DIAGNOSIS — R10.9 FLANK PAIN: Primary | ICD-10-CM

## 2017-03-15 LAB
ALBUMIN UR-MCNC: NEGATIVE MG/DL
ANION GAP SERPL CALCULATED.3IONS-SCNC: 11 MMOL/L (ref 3–14)
APPEARANCE UR: ABNORMAL
BACTERIA #/AREA URNS HPF: ABNORMAL /HPF
BASOPHILS # BLD AUTO: 0.1 10E9/L (ref 0–0.2)
BASOPHILS NFR BLD AUTO: 0.6 %
BILIRUB UR QL STRIP: NEGATIVE
BUN SERPL-MCNC: 15 MG/DL (ref 7–30)
CALCIUM SERPL-MCNC: 9 MG/DL (ref 8.5–10.1)
CHLORIDE SERPL-SCNC: 103 MMOL/L (ref 94–109)
CO2 SERPL-SCNC: 26 MMOL/L (ref 20–32)
COLOR UR AUTO: YELLOW
CREAT SERPL-MCNC: 0.69 MG/DL (ref 0.52–1.04)
DIFFERENTIAL METHOD BLD: NORMAL
EOSINOPHIL # BLD AUTO: 0.2 10E9/L (ref 0–0.7)
EOSINOPHIL NFR BLD AUTO: 1.4 %
ERYTHROCYTE [DISTWIDTH] IN BLOOD BY AUTOMATED COUNT: 12.6 % (ref 10–15)
GFR SERPL CREATININE-BSD FRML MDRD: 88 ML/MIN/1.7M2
GLUCOSE SERPL-MCNC: 157 MG/DL (ref 70–99)
GLUCOSE UR STRIP-MCNC: NEGATIVE MG/DL
HCT VFR BLD AUTO: 41.2 % (ref 35–47)
HGB BLD-MCNC: 13.4 G/DL (ref 11.7–15.7)
HGB UR QL STRIP: NEGATIVE
IMM GRANULOCYTES # BLD: 0 10E9/L (ref 0–0.4)
IMM GRANULOCYTES NFR BLD: 0.4 %
KETONES UR STRIP-MCNC: NEGATIVE MG/DL
LEUKOCYTE ESTERASE UR QL STRIP: NEGATIVE
LYMPHOCYTES # BLD AUTO: 3.5 10E9/L (ref 0.8–5.3)
LYMPHOCYTES NFR BLD AUTO: 33.6 %
MCH RBC QN AUTO: 28 PG (ref 26.5–33)
MCHC RBC AUTO-ENTMCNC: 32.5 G/DL (ref 31.5–36.5)
MCV RBC AUTO: 86 FL (ref 78–100)
MONOCYTES # BLD AUTO: 0.6 10E9/L (ref 0–1.3)
MONOCYTES NFR BLD AUTO: 5.6 %
MUCOUS THREADS #/AREA URNS LPF: PRESENT /LPF
NEUTROPHILS # BLD AUTO: 6.1 10E9/L (ref 1.6–8.3)
NEUTROPHILS NFR BLD AUTO: 58.4 %
NITRATE UR QL: NEGATIVE
NRBC # BLD AUTO: 0 10*3/UL
NRBC BLD AUTO-RTO: 0 /100
PH UR STRIP: 6 PH (ref 5–7)
PLATELET # BLD AUTO: 365 10E9/L (ref 150–450)
POTASSIUM SERPL-SCNC: 3.7 MMOL/L (ref 3.4–5.3)
RBC # BLD AUTO: 4.79 10E12/L (ref 3.8–5.2)
RBC #/AREA URNS AUTO: 1 /HPF (ref 0–2)
SODIUM SERPL-SCNC: 139 MMOL/L (ref 133–144)
SP GR UR STRIP: 1.02 (ref 1–1.03)
SQUAMOUS #/AREA URNS AUTO: 29 /HPF (ref 0–1)
URN SPEC COLLECT METH UR: ABNORMAL
UROBILINOGEN UR STRIP-MCNC: 0 MG/DL (ref 0–2)
WBC # BLD AUTO: 10.5 10E9/L (ref 4–11)
WBC #/AREA URNS AUTO: 7 /HPF (ref 0–2)

## 2017-03-15 NOTE — TELEPHONE ENCOUNTER
Patient called again regarding her back pain since having stent out .  Message sent to dr ahn regarding her recent renal ultrasound as well. Patient states that the pain is the same as last week. Yadira Cano LPN Staff Nurse

## 2017-03-19 LAB
BACTERIA SPEC CULT: ABNORMAL
Lab: ABNORMAL
MICRO REPORT STATUS: ABNORMAL
MICROORGANISM SPEC CULT: ABNORMAL
SPECIMEN SOURCE: ABNORMAL

## 2017-03-21 ENCOUNTER — PRE VISIT (OUTPATIENT)
Dept: UROLOGY | Facility: CLINIC | Age: 57
End: 2017-03-21

## 2017-03-21 NOTE — TELEPHONE ENCOUNTER
Patient coming in for kidney stone check per Dr Galeano. Patient had CT Scan on 3/15/17. Records in [facility]. No need to call patient

## 2017-03-27 DIAGNOSIS — N32.81 OVERACTIVE BLADDER: Primary | ICD-10-CM

## 2017-03-27 RX ORDER — TOLTERODINE TARTRATE 2 MG/1
2 TABLET, EXTENDED RELEASE ORAL 2 TIMES DAILY
Qty: 60 TABLET | Refills: 1 | Status: SHIPPED | OUTPATIENT
Start: 2017-03-27 | End: 2017-03-28

## 2017-03-28 ENCOUNTER — OFFICE VISIT (OUTPATIENT)
Dept: UROLOGY | Facility: CLINIC | Age: 57
End: 2017-03-28

## 2017-03-28 VITALS
WEIGHT: 176 LBS | BODY MASS INDEX: 31.18 KG/M2 | HEIGHT: 63 IN | SYSTOLIC BLOOD PRESSURE: 148 MMHG | HEART RATE: 90 BPM | DIASTOLIC BLOOD PRESSURE: 101 MMHG

## 2017-03-28 DIAGNOSIS — R10.9 FLANK PAIN: Primary | ICD-10-CM

## 2017-03-28 ASSESSMENT — PAIN SCALES - GENERAL: PAINLEVEL: MODERATE PAIN (5)

## 2017-03-28 NOTE — PATIENT INSTRUCTIONS
Follow up with Dr Galeano in 3 months with a renal US  You have been referred for pain management     It was a pleasure meeting with you today.  Thank you for allowing me and my team the privilege of caring for you today.  YOU are the reason we are here, and I truly hope we provided you with the excellent service you deserve.  Please let us know if there is anything else we can do for you so that we can be sure you are leaving completely satisfied with your care experience.

## 2017-03-28 NOTE — MR AVS SNAPSHOT
After Visit Summary   3/28/2017    Sophia Andrew    MRN: [id number]           Patient Information     Date Of Birth          1960        Visit Information        Provider Department      3/28/2017 8:30 AM Daniel Galeano MD [facility] Urology and [facility] for Prostate and Urologic Cancers        Today's Diagnoses     Flank pain    -  1      Care Instructions    Follow up with Dr Galeano in 3 months with a renal US  You have been referred for pain management     It was a pleasure meeting with you today.  Thank you for allowing me and my team the privilege of caring for you today.  YOU are the reason we are here, and I truly hope we provided you with the excellent service you deserve.  Please let us know if there is anything else we can do for you so that we can be sure you are leaving completely satisfied with your care experience.                Follow-ups after your visit        Additional Services     PAIN MANAGEMENT CENTER ([place]) REFERRAL       Your provider has referred you to the [facility].    Reason for Referral: Comprehensive Evaluation and Management    Please complete the following questions:    What is your diagnosis for the patient's pain? Chronic left flank pain, history of stones and numerous kidney surgeries    Do you have any specific questions for the pain specialist? Yes: Please consider for possible intercostal block vs accupuncture for chronic left flank pain      Are there any red flags that may impact the assessment or management of the patient? None    **ANY DIAGNOSTIC TESTS THAT ARE NOT IN EPIC SHOULD BE SENT TO THE PAIN CENTER**    Please note the Pre-Op Pain Consult must be scheduled 2-3 weeks prior to the patient's surgery.  Patient's trying to schedule within 2 weeks of surgery may not be accommodated.     Pre-Op Pain Consults are only good for 30 days.    REGARDING OPIOID MEDICATIONS:  We will always address appropriateness of opioid pain  medications, but we generally will not automatically take on a prescribing role. When we do take on prescribing of opioids for chronic pain, it is in collaboration with the referring physician for an intermediate period of time (months), with an expectation that the primary physician or provider will assume the prescribing role if medications are effective at stable doses with demonstrated compliance.  Therefore, please do not assume that your prescribing responsibilities end on the day of pain clinic consultation.  Is this agreeable to you? YES    For any questions, contact the [facility] at [phone].    Please be aware that coverage of these services is subject to the terms and limitations of your health insurance plan.  Call member services at your health plan with any benefit or coverage questions.      Please bring the following with you to your appointment:    (1) Any X-Rays, CTs or MRIs which have been performed.  Contact the facility where they were done to arrange for  prior to your scheduled appointment.    (2) List of current medications   (3) This referral request   (4) Any documents/labs given to you for this referral                  Your next 10 appointments already scheduled     Jun 27, 2017 12:15 PM CDT   US RENAL COMPLETE with [id number]   [facility] US ([facility])    [address] [address] [phone]           Please bring a list of your medicines (including vitamins, minerals and over-the-counter drugs). Also, tell your doctor about any allergies you may have. Wear comfortable clothes and leave your valuables at home.  You do not need to do anything special to prepare for your exam.  Please call the Imaging Department at your exam site with any questions.            Jun 27, 2017  1:00 PM CDT   (Arrive by 12:45 PM)   Return Visit with Daniel Galeano MD   [facility] Urology and [facility] for  "Prostate and Urologic Cancers ([facility])    [address]  4th [address]  [place] [address] [phone]              Future tests that were ordered for you today     Open Future Orders        Priority Expected Expires Ordered    Renal US Routine 6/26/2017 3/28/2018 3/28/2017            Who to contact     Please call your clinic at [phone] to:    Ask questions about your health    Make or cancel appointments    Discuss your medicines    Learn about your test results    Speak to your doctor   If you have compliments or concerns about an experience at your clinic, or if you wish to file a complaint, please contact [facility] Patient Relations at [phone] or email us at [email or web address]         Additional Information About Your Visit        [employer] Information     [employer] gives you secure access to your electronic health record. If you see a primary care provider, you can also send messages to your care team and make appointments. If you have questions, please call your primary care clinic.  If you do not have a primary care provider, please call [phone] and they will assist you.      [employer] is an electronic gateway that provides easy, online access to your medical records. With [employer], you can request a clinic appointment, read your test results, renew a prescription or communicate with your care team.     To access your existing account, please contact your [facility] or call [phone] for assistance.        Care EveryWhere ID     This is your Care EveryWhere ID. This could be used by other organizations to access your [facility] medical records  [id number]        Your Vitals Were     Pulse Height BMI (Body Mass Index)             90 1.6 m (5' 3\") 31.18 kg/m2          Blood Pressure from Last 3 Encounters:   03/28/17 (!) 148/101   03/07/17 132/85   02/09/17 118/87    Weight from Last 3 " Encounters:   03/28/17 79.8 kg (176 lb)   03/07/17 79.8 kg (176 lb)   02/09/17 80 kg (176 lb 5.9 oz)              We Performed the Following     PAIN MANAGEMENT CENTER ([place]) REFERRAL        Primary Care Provider Office Phone # Fax #    [facility] [phone] [address] [address]        Thank you!     Thank you for choosing [facility] UROLOGY AND [facility] FOR PROSTATE AND UROLOGIC CANCERS  for your care. Our goal is always to provide you with excellent care. Hearing back from our patients is one way we can continue to improve our services. Please take a few minutes to complete the written survey that you may receive in the mail after your visit with us. Thank you!             Your Updated Medication List - Protect others around you: Learn how to safely use, store and throw away your medicines at www.disposemymeds.org.          This list is accurate as of: 3/28/17  9:04 AM.  Always use your most recent med list.                   Brand Name Dispense Instructions for use    ATORVASTATIN CALCIUM PO          ibuprofen 600 MG tablet    ADVIL/MOTRIN    30 tablet    Take 1 tablet (600 mg) by mouth every 6 hours as needed for moderate pain       LISINOPRIL PO          tamsulosin 0.4 MG capsule    FLOMAX    50 capsule    Take 1 capsule (0.4 mg) by mouth daily For stent related discomfort

## 2017-03-28 NOTE — LETTER
"3/28/2017       RE: Sophia Andrew  [address]  [address]  [place] [place] [address]     Dear Colleague,    Thank you for referring your patient, Sophia Andrew, to the [facility] at [facility]. Please see a copy of my visit note below.           UROLOGY FOLLOW-UP NOTE          Chief Complaint:   Today I had the pleasure of seeing Ms. Sophia Andrew in follow-up for a chief complaint of left flank pain         Interval Update    Sophia Andrwe is a very pleasant 56 year old female last seen one month ago.  At our last visit she underwent left ureteroscopy with dilation of stenotic infundibulum.  This was treated with a prolonged large stent as the hydrocalyx identified at the time of ureteroscopy was though to have possible contributing to her chronic left flank pain.  Initially there had been consideration of attempt left PCNL for a small stone < 1cm suspected to be remnant in a prior calyceal divericulum. Unfortuantely this was not fluoroscopically evident in the OR and as such was unable to be targeted.   Since last being seen her stent has come out but she continues to have left flank pain.  This comes and goes without identifiable palliative or provocative factors.  She has not had any signs of UTI and post-op urine culture was positive only for a mixture of suspected urinary contaminants including cornybacter and coag neg staph.  Post-op CT was unremarkable aside from the left parenchymal calficiation.         Physical Exam:   Patient is a 56 year old  female   Vitals: Blood pressure (!) 148/101, pulse 90, height 1.6 m (5' 3\"), weight 79.8 kg (176 lb), not currently breastfeeding.  General Appearance Adult: Alert, no acute distress, oriented  HENT: throat/mouth:normal, good dentition  Neck: No adenopathy,masses or thyromegaly  Lungs: no respiratory distress, or pursed lip breathing  Heart: No obvious jugular venous " distension present  Abdomen: soft, nontender, no organomegaly or masses, Body mass index is 31.18 kg/(m^2).  Lymphatics: No cervical or supraclavicular adenopathy  Musculoskeltal: extremities normal, no peripheral edema  Skin: no suspicious lesions or rashes  Neuro: Alert, oriented, speech and mentation normal  Psych: affect and mood normal  Gait: Normal      Labs and Pathology:    I personally reviewed all applicable laboratory data and went over findings with patient  Significant for:    CBC RESULTS:  Recent Labs   Lab Test  03/15/17   1431  01/19/17   1602  08/09/16   1118  08/01/16   1256   WBC  10.5  10.6  13.4*  10.6   HGB  13.4  11.9  11.3*  11.9   PLT  365  341  516*  379        BMP RESULTS:  Recent Labs   Lab Test  03/15/17   1431  01/19/17   1602  08/09/16   1118  08/01/16   1256   NA  139  143  140  138   POTASSIUM  3.7  4.0  4.2  3.8   CHLORIDE  103  108  106  106   CO2  26  25  28  26   ANIONGAP  11  10  6  5   GLC  157*  114*  96  102*   BUN  15  10  14  15   CR  0.69  0.73  0.76  0.84   GFRESTIMATED  88  82  79  71   GFRESTBLACK  >90   GFR Calc    >90   GFR Calc    >90   GFR Calc    86   FARSHAD  9.0  8.7  9.4  8.8       UA RESULTS:   Recent Labs   Lab Test  03/15/17   1445  01/19/17   1607  08/09/16   0816   SG  1.018  1.024  1.017   URINEPH  6.0  6.0  6.0   NITRITE  Negative  Negative  Negative   RBCU  1  53*  2   WBCU  7*  1  14*         Imaging:    I personally reviewed all applicable imaging and went over findings with patient.  Significant for CT 3/15/17  IMPRESSION:   1. Asymmetric left ureteral ectasia with subtle periureteral  stranding. No obstructing ureteral stone is seen. Findings could be  secondary to a recently passed stone. No hydronephrosis.  2. 7 x 4 mm nonobstructing stone in the interpolar region of the left  kidney with several smaller adjacent stone fragments.  3. No stones in the right kidney. No right hydronephrosis.            Past  Medical History:     Past Medical History:   Diagnosis Date     Hypertension      Kidney stones 2011    nephrostomy tube            Past Surgical History:     Past Surgical History:   Procedure Laterality Date      SECTION  X2      SECTION      x2     COMBINED CYSTOSCOPY, INSERT STENT URETER(S)  1/15/2014    Procedure: COMBINED CYSTOSCOPY, INSERT STENT URETER(S);  Cystoscopy, Right ureteral stent placement;  Surgeon: Patience Coy MD;  [facility]     COMBINED CYSTOSCOPY, INSERT STENT URETER(S) Left 2015    Procedure: COMBINED CYSTOSCOPY, INSERT STENT URETER(S);  Surgeon: Marisol Grubbs MD;  [facility]     COMBINED CYSTOSCOPY, RETROGRADES, URETEROSCOPY, LASER HOLMIUM LITHOTRIPSY URETER(S), INSERT STENT Left 2015    Procedure: COMBINED CYSTOSCOPY, RETROGRADES, URETEROSCOPY, LASER HOLMIUM LITHOTRIPSY URETER(S), INSERT STENT;  Surgeon: Mayte Bowers MD;  [facility]     COMBINED CYSTOSCOPY, RETROGRADES, URETEROSCOPY, LASER HOLMIUM LITHOTRIPSY URETER(S), INSERT STENT Left 2015    Procedure: COMBINED CYSTOSCOPY, RETROGRADES, URETEROSCOPY, LASER HOLMIUM LITHOTRIPSY URETER(S), INSERT STENT;  Surgeon: Mayte Bowers MD;  [facility]     COMBINED CYSTOSCOPY, RETROGRADES, URETEROSCOPY, LASER HOLMIUM LITHOTRIPSY URETER(S), INSERT STENT N/A 2017    Procedure: COMBINED CYSTOSCOPY, RETROGRADES, URETEROSCOPY, LASER HOLMIUM LITHOTRIPSY URETER(S), INSERT STENT;  Surgeon: Daniel Galeano MD;  [facility]     CYSTOSCOPY, RETROGRADES, INSERT STENT URETER(S), COMBINED Left 2016    Procedure: COMBINED CYSTOSCOPY, RETROGRADES, INSERT STENT URETER(S);  Surgeon: Daniel Galeano MD;  [facility]     CYSTOSCOPY, URETEROSCOPY, COMBINED  2011    Procedure:COMBINED CYSTOSCOPY, URETEROSCOPY; Holmium Laser Lithotripsy ; Surgeon:MAYTE BOWERS; [facility]     EXTRACORPOREAL SHOCK WAVE LITHOTRIPSY (ESWL)      right kidney      GYN SURGERY       HYSTERECTOMY       LASER HOLMIUM LITHOTRIPSY URETER(S), INSERT STENT, COMBINED  5/5/2011    Procedure:COMBINED CYSTOSCOPY, URETEROSCOPY, LASER HOLMIUM LITHOTRIPSY URETER(S), INSERT STENT; Surgeon:MAYTE BOWERS; [facility]     LASER HOLMIUM LITHOTRIPSY URETER(S), INSERT STENT, COMBINED  1/29/2014    Procedure: COMBINED CYSTOSCOPY, URETEROSCOPY, LASER HOLMIUM LITHOTRIPSY URETER(S), INSERT STENT;  Cystoscopy, Right Ureteroscopy With Holmium Laser, Right Stent Placement  ;  Surgeon: Mayte Bowers MD;  [facility]     LASER HOLMIUM LITHOTRIPSY URETER(S), INSERT STENT, COMBINED Left 6/17/2015    Procedure: COMBINED CYSTOSCOPY, URETEROSCOPY, LASER HOLMIUM LITHOTRIPSY URETER(S), INSERT STENT;  Surgeon: Mayte Bowers MD;  [facility]     LASER HOLMIUM LITHOTRIPSY URETER(S), INSERT STENT, COMBINED Bilateral 8/6/2015    Procedure: COMBINED CYSTOSCOPY, URETEROSCOPY, LASER HOLMIUM LITHOTRIPSY URETER(S), INSERT STENT;  Surgeon: Mayte Bowers MD;  [facility]     LASER HOLMIUM LITHOTRIPSY URETER(S), INSERT STENT, COMBINED Left 8/19/2015    Procedure: COMBINED CYSTOSCOPY, URETEROSCOPY, LASER HOLMIUM LITHOTRIPSY URETER(S), INSERT STENT;  Surgeon: Mayte Bowers MD;  [facility]     LASER HOLMIUM LITHOTRIPSY URETER(S), INSERT STENT, COMBINED Left 8/18/2016    Procedure: COMBINED CYSTOSCOPY, URETEROSCOPY, LASER HOLMIUM LITHOTRIPSY URETER(S), INSERT STENT;  Surgeon: Daniel Galeano MD;  [facility]     LASER HOLMIUM NEPHROLITHOTOMY VIA PERCUTANEOUS NEPHROSTOMY  10/20/2011    Procedure:LASER HOLMIUM NEPHROLITHOTOMY VIA PERCUTANEOUS NEPHROSTOMY; Left Holmium Laser of a Caliceal Diverticulum, Stent Placement with Balloon Dilatation, Ureteroscopy, Cystoscopy.; Surgeon:MAYTE BOWERS; [facility]     LASER HOLMIUM NEPHROLITHOTOMY VIA PERCUTANEOUS NEPHROSTOMY  5/5/2011    Procedure:LASER HOLMIUM NEPHROLITHOTOMY VIA PERCUTANEOUS NEPHROSTOMY;  Ureteroscopy, retrograde pylogram, left stent placement.  laser not used, laser setup and not used.; Surgeon:MAYTE BOWERS; [facility]     PERCUTANEOUS NEPHROLITHOTOMY  4/14/2011    Procedure:PERCUTANEOUS NEPHROLITHOTOMY; Surgeon:MAYTE BOWERS; [facility]            Medications     Current Outpatient Prescriptions   Medication     LISINOPRIL PO     ATORVASTATIN CALCIUM PO     ibuprofen (ADVIL,MOTRIN) 600 MG tablet     tamsulosin (FLOMAX) 0.4 MG capsule     No current facility-administered medications for this visit.             Family History:     Family History   Problem Relation Age of Onset     DIABETES Sister      DIABETES Mother      DIABETES Other      DIABETES Maternal Grandmother      DIABETES Maternal Grandfather      Prostate Cancer Father      Thyroid Disease Mother      Coronary Artery Disease No family hx of      Hypertension No family hx of      Hyperlipidemia No family hx of      CEREBROVASCULAR DISEASE No family hx of      Breast Cancer No family hx of      Colon Cancer No family hx of      Other Cancer No family hx of      Depression No family hx of      Anxiety Disorder No family hx of      MENTAL ILLNESS No family hx of      Substance Abuse No family hx of      Anesthesia Reaction No family hx of      Asthma No family hx of      OSTEOPOROSIS No family hx of      Genetic Disorder No family hx of      Obesity No family hx of             Social History:     Social History     Social History     Marital status: Single     Spouse name: N/A     Number of children: N/A     Years of education: N/A     Occupational History     Not on file.     Social History Main Topics     Smoking status: Former Smoker     Packs/day: 0.50     Years: 40.00     Types: Cigarettes     Quit date: 7/24/2016     Smokeless tobacco: Never Used     Alcohol use No     Drug use: No     Sexual activity: No     Other Topics Concern     Not on file     Social History Narrative    Single.  Engaged.      Works at  [employer]    2 children - 1 son with hypothyroid    7 siblings - 1 sister with history of CVA.          Mother :  with history of blood clots., diabetes mellitus, hypertension, heart disease    Father living (hx of cancer)        No family history of bleeding, clotting disorders or complications with anesthesia.                Allergies:   Codeine sulfate; Hydromorphone; and Pollen extract         Review of Systems:  From intake questionnaire   Negative 14 system review except as noted on HPI, nurse's note.           Assessment/Plan     Assessment - 57 yo recurrent Brushite stone former with hx of left calyceal diverticulum s/p attempted PCNL 5+ years ago and chronic left flank pain    -Discussed that nature of left flank pain remains unclear, calyceal tic with stone remains a possibility and would be willing to attempt PCNL with CT guidance given difficulty visualizing stone with C-arm  -Alternatively will have evaluation with pain management for time being to consider other remedies and causes of pain.  Accupuncture and intercostal injections may have some utility in helping pain  -RTC in 3 months with renal US  -Encouraged fluids again given prior hx of low urinary volume and stone recurrence    Daniel Galeano MD    >15 minutes were spent face to face with patient over half of which was spent providing medical counseling regarding neprholithiasis, calyceal diverticulum and flank pain

## 2017-03-30 NOTE — PROGRESS NOTES
"         UROLOGY FOLLOW-UP NOTE          Chief Complaint:   Today I had the pleasure of seeing Ms. Sophia Andrew in follow-up for a chief complaint of left flank pain         Interval Update    Sophia Andrew is a very pleasant 56 year old female last seen one month ago.  At our last visit she underwent left ureteroscopy with dilation of stenotic infundibulum.  This was treated with a prolonged large stent as the hydrocalyx identified at the time of ureteroscopy was though to have possible contributing to her chronic left flank pain.  Initially there had been consideration of attempt left PCNL for a small stone < 1cm suspected to be remnant in a prior calyceal divericulum. Unfortuantely this was not fluoroscopically evident in the OR and as such was unable to be targeted.   Since last being seen her stent has come out but she continues to have left flank pain.  This comes and goes without identifiable palliative or provocative factors.  She has not had any signs of UTI and post-op urine culture was positive only for a mixture of suspected urinary contaminants including cornybacter and coag neg staph.  Post-op CT was unremarkable aside from the left parenchymal calficiation.         Physical Exam:   Patient is a 56 year old  female   Vitals: Blood pressure (!) 148/101, pulse 90, height 1.6 m (5' 3\"), weight 79.8 kg (176 lb), not currently breastfeeding.  General Appearance Adult: Alert, no acute distress, oriented  HENT: throat/mouth:normal, good dentition  Neck: No adenopathy,masses or thyromegaly  Lungs: no respiratory distress, or pursed lip breathing  Heart: No obvious jugular venous distension present  Abdomen: soft, nontender, no organomegaly or masses, Body mass index is 31.18 kg/(m^2).  Lymphatics: No cervical or supraclavicular adenopathy  Musculoskeltal: extremities normal, no peripheral edema  Skin: no suspicious lesions or rashes  Neuro: Alert, oriented, speech and mentation normal  Psych: affect and " mood normal  Gait: Normal      Labs and Pathology:    I personally reviewed all applicable laboratory data and went over findings with patient  Significant for:    CBC RESULTS:  Recent Labs   Lab Test  03/15/17   1431  17   1602  16   1118  16   1256   WBC  10.5  10.6  13.4*  10.6   HGB  13.4  11.9  11.3*  11.9   PLT  365  341  516*  379        BMP RESULTS:  Recent Labs   Lab Test  03/15/17   1431  17   1602  16   1118  16   1256   NA  139  143  140  138   POTASSIUM  3.7  4.0  4.2  3.8   CHLORIDE  103  108  106  106   CO2  26  25  28  26   ANIONGAP  11  10  6  5   GLC  157*  114*  96  102*   BUN  15  10  14  15   CR  0.69  0.73  0.76  0.84   GFRESTIMATED  88  82  79  71   GFRESTBLACK  >90   GFR Calc    >90   GFR Calc    >90   GFR Calc    86   FARSHAD  9.0  8.7  9.4  8.8       UA RESULTS:   Recent Labs   Lab Test  03/15/17   1445  17   1607  16   0816   SG  1.018  1.024  1.017   URINEPH  6.0  6.0  6.0   NITRITE  Negative  Negative  Negative   RBCU  1  53*  2   WBCU  7*  1  14*         Imaging:    I personally reviewed all applicable imaging and went over findings with patient.  Significant for CT 3/15/17  IMPRESSION:   1. Asymmetric left ureteral ectasia with subtle periureteral  stranding. No obstructing ureteral stone is seen. Findings could be  secondary to a recently passed stone. No hydronephrosis.  2. 7 x 4 mm nonobstructing stone in the interpolar region of the left  kidney with several smaller adjacent stone fragments.  3. No stones in the right kidney. No right hydronephrosis.            Past Medical History:     Past Medical History:   Diagnosis Date     Hypertension      Kidney stones 2011    nephrostomy tube            Past Surgical History:     Past Surgical History:   Procedure Laterality Date      SECTION  X2      SECTION      x2     COMBINED CYSTOSCOPY, INSERT STENT URETER(S)  1/15/2014     Procedure: COMBINED CYSTOSCOPY, INSERT STENT URETER(S);  Cystoscopy, Right ureteral stent placement;  Surgeon: Patience Coy MD;  [facility]     COMBINED CYSTOSCOPY, INSERT STENT URETER(S) Left 7/19/2015    Procedure: COMBINED CYSTOSCOPY, INSERT STENT URETER(S);  Surgeon: Marisol Grubbs MD;  [facility]     COMBINED CYSTOSCOPY, RETROGRADES, URETEROSCOPY, LASER HOLMIUM LITHOTRIPSY URETER(S), INSERT STENT Left 6/4/2015    Procedure: COMBINED CYSTOSCOPY, RETROGRADES, URETEROSCOPY, LASER HOLMIUM LITHOTRIPSY URETER(S), INSERT STENT;  Surgeon: Mayte Bowers MD;  [facility]     COMBINED CYSTOSCOPY, RETROGRADES, URETEROSCOPY, LASER HOLMIUM LITHOTRIPSY URETER(S), INSERT STENT Left 9/9/2015    Procedure: COMBINED CYSTOSCOPY, RETROGRADES, URETEROSCOPY, LASER HOLMIUM LITHOTRIPSY URETER(S), INSERT STENT;  Surgeon: Mayte Bowers MD;  [facility]     COMBINED CYSTOSCOPY, RETROGRADES, URETEROSCOPY, LASER HOLMIUM LITHOTRIPSY URETER(S), INSERT STENT N/A 2/9/2017    Procedure: COMBINED CYSTOSCOPY, RETROGRADES, URETEROSCOPY, LASER HOLMIUM LITHOTRIPSY URETER(S), INSERT STENT;  Surgeon: Daniel Galeano MD;  [facility]     CYSTOSCOPY, RETROGRADES, INSERT STENT URETER(S), COMBINED Left 8/9/2016    Procedure: COMBINED CYSTOSCOPY, RETROGRADES, INSERT STENT URETER(S);  Surgeon: Daniel Galeano MD;  [facility]     CYSTOSCOPY, URETEROSCOPY, COMBINED  4/14/2011    Procedure:COMBINED CYSTOSCOPY, URETEROSCOPY; Holmium Laser Lithotripsy ; Surgeon:MAYTE BOWERS; [facility]     EXTRACORPOREAL SHOCK WAVE LITHOTRIPSY (ESWL)      right kidney     GYN SURGERY       HYSTERECTOMY       LASER HOLMIUM LITHOTRIPSY URETER(S), INSERT STENT, COMBINED  5/5/2011    Procedure:COMBINED CYSTOSCOPY, URETEROSCOPY, LASER HOLMIUM LITHOTRIPSY URETER(S), INSERT STENT; Surgeon:MAYTE BOWERS; [facility]     LASER HOLMIUM LITHOTRIPSY URETER(S), INSERT STENT, COMBINED  1/29/2014     Procedure: COMBINED CYSTOSCOPY, URETEROSCOPY, LASER HOLMIUM LITHOTRIPSY URETER(S), INSERT STENT;  Cystoscopy, Right Ureteroscopy With Holmium Laser, Right Stent Placement  ;  Surgeon: Mayte Purcell MD;  [facility]     LASER HOLMIUM LITHOTRIPSY URETER(S), INSERT STENT, COMBINED Left 6/17/2015    Procedure: COMBINED CYSTOSCOPY, URETEROSCOPY, LASER HOLMIUM LITHOTRIPSY URETER(S), INSERT STENT;  Surgeon: Mayte Purcell MD;  [facility]     LASER HOLMIUM LITHOTRIPSY URETER(S), INSERT STENT, COMBINED Bilateral 8/6/2015    Procedure: COMBINED CYSTOSCOPY, URETEROSCOPY, LASER HOLMIUM LITHOTRIPSY URETER(S), INSERT STENT;  Surgeon: Mayte Purcell MD;  [facility]     LASER HOLMIUM LITHOTRIPSY URETER(S), INSERT STENT, COMBINED Left 8/19/2015    Procedure: COMBINED CYSTOSCOPY, URETEROSCOPY, LASER HOLMIUM LITHOTRIPSY URETER(S), INSERT STENT;  Surgeon: Mayte Purcell MD;  [facility]     LASER HOLMIUM LITHOTRIPSY URETER(S), INSERT STENT, COMBINED Left 8/18/2016    Procedure: COMBINED CYSTOSCOPY, URETEROSCOPY, LASER HOLMIUM LITHOTRIPSY URETER(S), INSERT STENT;  Surgeon: Daniel Galeano MD;  [facility]     LASER HOLMIUM NEPHROLITHOTOMY VIA PERCUTANEOUS NEPHROSTOMY  10/20/2011    Procedure:LASER HOLMIUM NEPHROLITHOTOMY VIA PERCUTANEOUS NEPHROSTOMY; Left Holmium Laser of a Caliceal Diverticulum, Stent Placement with Balloon Dilatation, Ureteroscopy, Cystoscopy.; Surgeon:MAYTE PURCELL; [facility]     LASER HOLMIUM NEPHROLITHOTOMY VIA PERCUTANEOUS NEPHROSTOMY  5/5/2011    Procedure:LASER HOLMIUM NEPHROLITHOTOMY VIA PERCUTANEOUS NEPHROSTOMY; Ureteroscopy, retrograde pylogram, left stent placement.  laser not used, laser setup and not used.; Surgeon:MAYTE PURCELL; [facility]     PERCUTANEOUS NEPHROLITHOTOMY  4/14/2011    Procedure:PERCUTANEOUS NEPHROLITHOTOMY; Surgeon:MAYTE PURCELL; [facility]            Medications     Current Outpatient Prescriptions    Medication     LISINOPRIL PO     ATORVASTATIN CALCIUM PO     ibuprofen (ADVIL,MOTRIN) 600 MG tablet     tamsulosin (FLOMAX) 0.4 MG capsule     No current facility-administered medications for this visit.             Family History:     Family History   Problem Relation Age of Onset     DIABETES Sister      DIABETES Mother      DIABETES Other      DIABETES Maternal Grandmother      DIABETES Maternal Grandfather      Prostate Cancer Father      Thyroid Disease Mother      Coronary Artery Disease No family hx of      Hypertension No family hx of      Hyperlipidemia No family hx of      CEREBROVASCULAR DISEASE No family hx of      Breast Cancer No family hx of      Colon Cancer No family hx of      Other Cancer No family hx of      Depression No family hx of      Anxiety Disorder No family hx of      MENTAL ILLNESS No family hx of      Substance Abuse No family hx of      Anesthesia Reaction No family hx of      Asthma No family hx of      OSTEOPOROSIS No family hx of      Genetic Disorder No family hx of      Obesity No family hx of             Social History:     Social History     Social History     Marital status: Single     Spouse name: N/A     Number of children: N/A     Years of education: N/A     Occupational History     Not on file.     Social History Main Topics     Smoking status: Former Smoker     Packs/day: 0.50     Years: 40.00     Types: Cigarettes     Quit date: 2016     Smokeless tobacco: Never Used     Alcohol use No     Drug use: No     Sexual activity: No     Other Topics Concern     Not on file     Social History Narrative    Single.  Engaged.      Works at [employer]    2 children - 1 son with hypothyroid    7 siblings - 1 sister with history of CVA.          Mother :  with history of blood clots., diabetes mellitus, hypertension, heart disease    Father living (hx of cancer)        No family history of bleeding, clotting disorders or complications with anesthesia.                 Allergies:   Codeine sulfate; Hydromorphone; and Pollen extract         Review of Systems:  From intake questionnaire   Negative 14 system review except as noted on HPI, nurse's note.           Assessment/Plan     Assessment - 55 yo recurrent Brushite stone former with hx of left calyceal diverticulum s/p attempted PCNL 5+ years ago and chronic left flank pain    -Discussed that nature of left flank pain remains unclear, calyceal tic with stone remains a possibility and would be willing to attempt PCNL with CT guidance given difficulty visualizing stone with C-arm  -Alternatively will have evaluation with pain management for time being to consider other remedies and causes of pain.  Accupuncture and intercostal injections may have some utility in helping pain  -RTC in 3 months with renal US  -Encouraged fluids again given prior hx of low urinary volume and stone recurrence    Daniel Galeano MD    >15 minutes were spent face to face with patient over half of which was spent providing medical counseling regarding neprholithiasis, calyceal diverticulum and flank pain

## 2017-04-04 ENCOUNTER — TELEPHONE (OUTPATIENT)
Dept: PALLIATIVE MEDICINE | Facility: CLINIC | Age: 57
End: 2017-04-04

## 2017-04-04 NOTE — TELEPHONE ENCOUNTER
[facility] Referral      1. Confirmed address with patient? Yes  2. Confirmed phone number with patient? Yes  3. Confirmed referring provider? Yes  4. Is the PCP the same as the referring provider? No  5. Has the patient been to any previous pain clinics? No  (If yes, send CHLOE with welcome letter)  6. Which insurance are we to bill for this appointment?  [employer]    7. Informed pt of cancellation (48 hour) policy? Yes    REGARDING OPIOID MEDICATIONS: We will always address appropriateness of opioid pain medications, but we generally will not automatically take on a prescribing role. When we do take on prescribing of opioids for chronic pain, it is in collaboration with the referring physician for an intermediate period of time (months), with an expectation that the primary physician or provider will assume the prescribing role if medications are effective at stable doses with demonstrated compliance. Therefore, please do not assume that your prescribing responsibilities end on the day of pain clinic consultation.  7. Informed pt of prescribing policy? Yes      8. Referring Provider:     9. Criteria for Triage Eval:   -Missed/Failed 1st DUAL appointment? N/A   -Medication Focused? N/A   -Mental Health Concerns? (e.g. Recent psych hospitalization/snap shot)? N/A   -Active substance abuse? N/A   -Patient behaviors (e.g. Offensive language/raised voice)? N/A

## 2017-04-06 ENCOUNTER — TELEPHONE (OUTPATIENT)
Dept: UROLOGY | Facility: CLINIC | Age: 57
End: 2017-04-06

## 2017-04-06 DIAGNOSIS — R10.9 FLANK PAIN: Primary | ICD-10-CM

## 2017-04-06 RX ORDER — HYDROCODONE BITARTRATE AND ACETAMINOPHEN 5; 325 MG/1; MG/1
1 TABLET ORAL EVERY 4 HOURS PRN
Qty: 18 TABLET | Refills: 0 | Status: ON HOLD | OUTPATIENT
Start: 2017-04-06 | End: 2018-03-15

## 2017-06-12 ENCOUNTER — PRE VISIT (OUTPATIENT)
Dept: UROLOGY | Facility: CLINIC | Age: 57
End: 2017-06-12

## 2017-06-12 NOTE — TELEPHONE ENCOUNTER
Patient coming in for kidney stone check. Patient has renal US prior to visit. Patient saw Dr Galeano last on 3/28/17. Records in epic. No need to contact patient

## 2017-07-15 ENCOUNTER — HEALTH MAINTENANCE LETTER (OUTPATIENT)
Age: 57
End: 2017-07-15

## 2017-07-19 ENCOUNTER — OFFICE VISIT (OUTPATIENT)
Dept: PALLIATIVE MEDICINE | Facility: CLINIC | Age: 57
End: 2017-07-19
Payer: MEDICAID

## 2017-07-19 VITALS
HEART RATE: 71 BPM | WEIGHT: 181 LBS | BODY MASS INDEX: 32.07 KG/M2 | SYSTOLIC BLOOD PRESSURE: 165 MMHG | DIASTOLIC BLOOD PRESSURE: 99 MMHG | HEIGHT: 63 IN

## 2017-07-19 DIAGNOSIS — G89.4 CHRONIC PAIN SYNDROME: Primary | ICD-10-CM

## 2017-07-19 DIAGNOSIS — G89.29 CHRONIC LEFT-SIDED LOW BACK PAIN WITHOUT SCIATICA: ICD-10-CM

## 2017-07-19 DIAGNOSIS — M79.18 MYOFASCIAL PAIN: ICD-10-CM

## 2017-07-19 DIAGNOSIS — R10.9 LEFT FLANK PAIN: Primary | ICD-10-CM

## 2017-07-19 DIAGNOSIS — M54.50 CHRONIC LEFT-SIDED LOW BACK PAIN WITHOUT SCIATICA: ICD-10-CM

## 2017-07-19 DIAGNOSIS — Z87.442 H/O RENAL CALCULI: ICD-10-CM

## 2017-07-19 DIAGNOSIS — M47.817 LUMBOSACRAL SPONDYLOSIS WITHOUT MYELOPATHY: ICD-10-CM

## 2017-07-19 PROCEDURE — 99244 OFF/OP CNSLTJ NEW/EST MOD 40: CPT | Performed by: NURSE PRACTITIONER

## 2017-07-19 PROCEDURE — 96150 HC HEALTH & BEHAVIOR ASSESS INITIAL, EA 15MIN: CPT | Performed by: PSYCHOLOGIST

## 2017-07-19 RX ORDER — METHOCARBAMOL 500 MG/1
500-1000 TABLET, FILM COATED ORAL 3 TIMES DAILY PRN
Qty: 60 TABLET | Refills: 1 | Status: ON HOLD | OUTPATIENT
Start: 2017-07-19 | End: 2018-03-15

## 2017-07-19 RX ORDER — GABAPENTIN 300 MG/1
CAPSULE ORAL
Qty: 120 CAPSULE | Refills: 0 | Status: SHIPPED | OUTPATIENT
Start: 2017-07-19 | End: 2017-08-15

## 2017-07-19 ASSESSMENT — PAIN SCALES - GENERAL: PAINLEVEL: SEVERE PAIN (7)

## 2017-07-19 NOTE — NURSING NOTE
"Chief Complaint   Patient presents with     Pain       Initial BP (!) 165/99  Pulse 71  Ht 1.6 m (5' 3\")  Wt 82.1 kg (181 lb)  BMI 32.06 kg/m2 Estimated body mass index is 32.06 kg/(m^2) as calculated from the following:    Height as of this encounter: 1.6 m (5' 3\").    Weight as of this encounter: 82.1 kg (181 lb).  Medication Reconciliation: complete     Ham Gruber MA      "

## 2017-07-19 NOTE — MR AVS SNAPSHOT
After Visit Summary   7/19/2017    Sophia Andrew    MRN: [id number]           Patient Information     Date Of Birth          1960        Visit Information        Provider Department      7/19/2017 1:00 PM Daniel Ramirez LP [facility]3]        Today's Diagnoses     Chronic pain syndrome    -  1       Follow-ups after your visit        Your next 10 appointments already scheduled     Oct 20, 2017  1:00 PM CDT   Return Visit with JORJE Tavera CNP   [facility]3] ([facility])    [address]  [place] [address] [phone]              Who to contact     If you have questions or need follow up information about today's clinic visit or your schedule please contact [facility] directly at [phone].  Normal or non-critical lab and imaging results will be communicated to you by ironSourcehart, letter or phone within 4 business days after the clinic has received the results. If you do not hear from us within 7 days, please contact the clinic through ironSourcehart or phone. If you have a critical or abnormal lab result, we will notify you by phone as soon as possible.  Submit refill requests through [employer] or call your pharmacy and they will forward the refill request to us. Please allow 3 business days for your refill to be completed.          Additional Information About Your Visit        MyChart Information     [employer] gives you secure access to your electronic health record. If you see a primary care provider, you can also send messages to your care team and make appointments. If you have questions, please call your primary care clinic.  If you do not have a primary care provider, please call [phone] and they will assist you.        Care EveryWhere ID     This is your Care EveryWhere ID. This could be used by other organizations to access your [facility] medical records  [id number]         Blood Pressure from Last  3 Encounters:   07/19/17 (!) 165/99   03/28/17 (!) 148/101   03/07/17 132/85    Weight from Last 3 Encounters:   07/19/17 82.1 kg (181 lb)   03/28/17 79.8 kg (176 lb)   03/07/17 79.8 kg (176 lb)              We Performed the Following     HEALTH & BEHAVIOR ASSESS INITIAL, EA 15MIN          Today's Medication Changes          These changes are accurate as of: 7/19/17 11:59 PM.  If you have any questions, ask your nurse or doctor.               Start taking these medicines.        Dose/Directions    gabapentin 300 MG capsule   Commonly known as:  NEURONTIN   Used for:  Left flank pain, H/O renal calculi, Chronic left-sided low back pain without sciatica, Lumbosacral spondylosis without myelopathy, Myofascial pain   Started by:  Rafaela Tejada APRN CNP        Take 300mg at bedtime for 7 days, then 300mg twice daily for 7 days, then take 300mg three times daily for 7 days, then take 300mg in the morning and afternoon and 600mg at bedtime. If side effects, then reduce to last tolerable dosage.   Quantity:  120 capsule   Refills:  0       methocarbamol 500 MG tablet   Commonly known as:  ROBAXIN   Used for:  Myofascial pain, Chronic left-sided low back pain without sciatica, Lumbosacral spondylosis without myelopathy   Started by:  Rafaela Tejada APRN CNP        Dose:  500-1000 mg   Take 1-2 tablets (500-1,000 mg) by mouth 3 times daily as needed for muscle spasms Caution sedation, start with lower dose first   Quantity:  60 tablet   Refills:  1            Where to get your medicines      These medications were sent to [facility]/pharmacy #[id number] - [facility], [place] - [address] [address]     Phone:  [phone]     methocarbamol 500 MG tablet         Some of these will need a paper prescription and others can be bought over the counter.  Ask your nurse if you have questions.     Bring a paper prescription for each of these medications     gabapentin 300 MG capsule                 Primary Care Provider Office Phone # Fax #    [facility] [phone] [address] [address]        Equal Access to Services     ETTA CHONG : Hadii aad ku hadnolberto Ag, wananyda luqadaha, qaybta kaalmada destinee, juliana jones shivamconstance collins laLibiakian louise. So [place] [phone].    ATENCIÓN: Si habla español, tiene a schreiber disposición servicios gratuitos de asistencia lingüística. Llame al [phone].    We comply with applicable federal civil rights laws and [place] laws. We do not discriminate on the basis of race, color, national origin, age, disability sex, sexual orientation or gender identity.            Thank you!     Thank you for choosing [facility]  for your care. Our goal is always to provide you with excellent care. Hearing back from our patients is one way we can continue to improve our services. Please take a few minutes to complete the written survey that you may receive in the mail after your visit with us. Thank you!             Your Updated Medication List - Protect others around you: Learn how to safely use, store and throw away your medicines at www.disposemymeds.org.          This list is accurate as of: 7/19/17 11:59 PM.  Always use your most recent med list.                   Brand Name Dispense Instructions for use Diagnosis    ATORVASTATIN CALCIUM PO           gabapentin 300 MG capsule    NEURONTIN    120 capsule    Take 300mg at bedtime for 7 days, then 300mg twice daily for 7 days, then take 300mg three times daily for 7 days, then take 300mg in the morning and afternoon and 600mg at bedtime. If side effects, then reduce to last tolerable dosage.    Left flank pain, H/O renal calculi, Chronic left-sided low back pain without sciatica, Lumbosacral spondylosis without myelopathy, Myofascial pain       HYDROcodone-acetaminophen 5-325 MG per tablet    NORCO    18 tablet    Take 1 tablet by mouth every 4 hours as needed  for pain maximum 18 tablet(s) per day    Flank pain       ibuprofen 600 MG tablet    ADVIL/MOTRIN    30 tablet    Take 1 tablet (600 mg) by mouth every 6 hours as needed for moderate pain    Calculus of kidney       LISINOPRIL PO           methocarbamol 500 MG tablet    ROBAXIN    60 tablet    Take 1-2 tablets (500-1,000 mg) by mouth 3 times daily as needed for muscle spasms Caution sedation, start with lower dose first    Myofascial pain, Chronic left-sided low back pain without sciatica, Lumbosacral spondylosis without myelopathy       tamsulosin 0.4 MG capsule    FLOMAX    50 capsule    Take 1 capsule (0.4 mg) by mouth daily For stent related discomfort    Nephrolithiasis

## 2017-07-19 NOTE — PROGRESS NOTES
"  [facility] Consultation    Date of visit: 7/19/2017    Reason for consultation:    Sophia Andrew is a 56 year old female who is seen in consultation today at the request of her provider, Dr. Galeano re: patient's chronic left flank pain, h/o kidney stones and multiple kidney surgeries    Primary Care Provider is Medicine, [facility].  Pain medications are being prescribed by multiple small scripts predominantly after kidney procedures. NOT ON CHRONIC OPIATES    Please see the [facility] health questionnaire which the patient completed and reviewed with me in detail.    Chief Complaint:  Left flank pain and painful urination  Chief Complaint   Patient presents with     Pain       Pain history:  Sophia Andrew is a 56 year old female who first started having problems with pain as follows:     -Chronic left flank pain  She has had left sided flank pain for about 6 months to a year after the previous 2 kidney surgeries. The last surgery was done in February and she had a urethral stent in, her urethra closed and stent had to be removed early due to bleeding. She has had multiple large kidney stones and has been instrumented for removal of kidney stones around 13 times.     Pain rating: intensity ranges from 5/10 to 9/10, and Averages 7/10 on a 0-10 scale.    Describes pain as \"aching.\"  Pain is constant.    Home self care includes: pain medication and heating pads and smoking marijuana    Aggravating factors include: standing a long time or trying to urinate    Relieving factors include: laying down on her back    Any bowel or bladder incontinence: none    Current pain-related medication treatments include:  -Norco 5/325mg take 1 tablet Q 4 hours PRN pain max of 18 tabs per day (she is out, last script was in April. This has been helpful in the past)  -ibuprofen 600mg Q 6 hours PRN (does not use on a daily basis, hurts her stomach)    Other pertinent " medications:  None    Previous medication treatments included:  OPIATES: Tylenol #3 (not helpful--nausea), Norco (helpful), Dilaudid (somewhat helpful), Percocet (helpful), Tramadol (somewhat helpful)  NSAIDS: ibuprofen (hurts her stomach), Aleve (unsure)  MUSCLE RELAXANTS: Flexeril (helpful--sedation)  ANTI-MIGRAINE MEDS: none  ANTI-DEPRESSANTS: none  SLEEP AIDS: none  ANTI-CONVULSANTS: none  TOPICALS: none  Other meds: Tylenol (not helpful)      Other treatments have included:  Sophia Andrew has not been seen at a pain clinic in the past.    PT: never for flank pain  Chiropractic: none  Acupuncture: none  TENs Unit: none    Injections:   none    Past Medical History:  Past Medical History:   Diagnosis Date     Hypertension      Kidney stones 2011    nephrostomy tube     Past Surgical History:  Past Surgical History:   Procedure Laterality Date      SECTION  X2      SECTION      x2     COMBINED CYSTOSCOPY, INSERT STENT URETER(S)  1/15/2014    Procedure: COMBINED CYSTOSCOPY, INSERT STENT URETER(S);  Cystoscopy, Right ureteral stent placement;  Surgeon: Patience Coy MD;  [facility]     COMBINED CYSTOSCOPY, INSERT STENT URETER(S) Left 2015    Procedure: COMBINED CYSTOSCOPY, INSERT STENT URETER(S);  Surgeon: Marisol Grubbs MD;  [facility]     COMBINED CYSTOSCOPY, RETROGRADES, URETEROSCOPY, LASER HOLMIUM LITHOTRIPSY URETER(S), INSERT STENT Left 2015    Procedure: COMBINED CYSTOSCOPY, RETROGRADES, URETEROSCOPY, LASER HOLMIUM LITHOTRIPSY URETER(S), INSERT STENT;  Surgeon: Yaakov Purcell MD;  [facility]     COMBINED CYSTOSCOPY, RETROGRADES, URETEROSCOPY, LASER HOLMIUM LITHOTRIPSY URETER(S), INSERT STENT Left 2015    Procedure: COMBINED CYSTOSCOPY, RETROGRADES, URETEROSCOPY, LASER HOLMIUM LITHOTRIPSY URETER(S), INSERT STENT;  Surgeon: Yaakov Purcell MD;  [facility]     COMBINED CYSTOSCOPY, RETROGRADES, URETEROSCOPY, LASER HOLMIUM  LITHOTRIPSY URETER(S), INSERT STENT N/A 2/9/2017    Procedure: COMBINED CYSTOSCOPY, RETROGRADES, URETEROSCOPY, LASER HOLMIUM LITHOTRIPSY URETER(S), INSERT STENT;  Surgeon: Daniel Galeano MD;  [facility]     CYSTOSCOPY, RETROGRADES, INSERT STENT URETER(S), COMBINED Left 8/9/2016    Procedure: COMBINED CYSTOSCOPY, RETROGRADES, INSERT STENT URETER(S);  Surgeon: Daniel Galeano MD;  [facility]     CYSTOSCOPY, URETEROSCOPY, COMBINED  4/14/2011    Procedure:COMBINED CYSTOSCOPY, URETEROSCOPY; Holmium Laser Lithotripsy ; Surgeon:MAYTE PURCELL; [facility]     EXTRACORPOREAL SHOCK WAVE LITHOTRIPSY (ESWL)      right kidney     GYN SURGERY       HYSTERECTOMY       LASER HOLMIUM LITHOTRIPSY URETER(S), INSERT STENT, COMBINED  5/5/2011    Procedure:COMBINED CYSTOSCOPY, URETEROSCOPY, LASER HOLMIUM LITHOTRIPSY URETER(S), INSERT STENT; Surgeon:MAYTE PURCELL; [facility]     LASER HOLMIUM LITHOTRIPSY URETER(S), INSERT STENT, COMBINED  1/29/2014    Procedure: COMBINED CYSTOSCOPY, URETEROSCOPY, LASER HOLMIUM LITHOTRIPSY URETER(S), INSERT STENT;  Cystoscopy, Right Ureteroscopy With Holmium Laser, Right Stent Placement  ;  Surgeon: Mayte Purcell MD;  [facility]     LASER HOLMIUM LITHOTRIPSY URETER(S), INSERT STENT, COMBINED Left 6/17/2015    Procedure: COMBINED CYSTOSCOPY, URETEROSCOPY, LASER HOLMIUM LITHOTRIPSY URETER(S), INSERT STENT;  Surgeon: Mayte Purcell MD;  [facility]     LASER HOLMIUM LITHOTRIPSY URETER(S), INSERT STENT, COMBINED Bilateral 8/6/2015    Procedure: COMBINED CYSTOSCOPY, URETEROSCOPY, LASER HOLMIUM LITHOTRIPSY URETER(S), INSERT STENT;  Surgeon: Mayte Purcell MD;  [facility]     LASER HOLMIUM LITHOTRIPSY URETER(S), INSERT STENT, COMBINED Left 8/19/2015    Procedure: COMBINED CYSTOSCOPY, URETEROSCOPY, LASER HOLMIUM LITHOTRIPSY URETER(S), INSERT STENT;  Surgeon: Mayte Purcell MD;  [facility]     LASER HOLMIUM LITHOTRIPSY  URETER(S), INSERT STENT, COMBINED Left 8/18/2016    Procedure: COMBINED CYSTOSCOPY, URETEROSCOPY, LASER HOLMIUM LITHOTRIPSY URETER(S), INSERT STENT;  Surgeon: Daniel Galeano MD;  [facility]     LASER HOLMIUM NEPHROLITHOTOMY VIA PERCUTANEOUS NEPHROSTOMY  10/20/2011    Procedure:LASER HOLMIUM NEPHROLITHOTOMY VIA PERCUTANEOUS NEPHROSTOMY; Left Holmium Laser of a Caliceal Diverticulum, Stent Placement with Balloon Dilatation, Ureteroscopy, Cystoscopy.; Surgeon:MAYTE BOWERS; [facility]     LASER HOLMIUM NEPHROLITHOTOMY VIA PERCUTANEOUS NEPHROSTOMY  5/5/2011    Procedure:LASER HOLMIUM NEPHROLITHOTOMY VIA PERCUTANEOUS NEPHROSTOMY; Ureteroscopy, retrograde pylogram, left stent placement.  laser not used, laser setup and not used.; Surgeon:MAYTE BOWERS; [facility]     PERCUTANEOUS NEPHROLITHOTOMY  4/14/2011    Procedure:PERCUTANEOUS NEPHROLITHOTOMY; Surgeon:MAYTE BOWERS; [facility]     Medications:  Current Outpatient Prescriptions   Medication Sig Dispense Refill     HYDROcodone-acetaminophen (NORCO) 5-325 MG per tablet Take 1 tablet by mouth every 4 hours as needed for pain maximum 18 tablet(s) per day 18 tablet 0     LISINOPRIL PO        ATORVASTATIN CALCIUM PO        tamsulosin (FLOMAX) 0.4 MG capsule Take 1 capsule (0.4 mg) by mouth daily For stent related discomfort 50 capsule 0     ibuprofen (ADVIL,MOTRIN) 600 MG tablet Take 1 tablet (600 mg) by mouth every 6 hours as needed for moderate pain 30 tablet 1     Allergies:     Allergies   Allergen Reactions     Codeine Sulfate Nausea     Hydromorphone Itching     Generalized body itching; required discontinuing the drug     Pollen Extract      Runny nose     Social History:  Home situation: lives with partner, has 2 grown children  Occupation/Schooling: not currently working, used to work in a [employer], last day was in April 2017  Tobacco use: quit smoking one year ago  Alcohol use: drinks a few drinks per month. wine  Drug  "use: smokes marijuana about weekly  History of chemical dependency treatment: none    Family history:  Family History   Problem Relation Age of Onset     DIABETES Sister      DIABETES Mother      DIABETES Other      DIABETES Maternal Grandmother      DIABETES Maternal Grandfather      Prostate Cancer Father      Thyroid Disease Mother      Coronary Artery Disease No family hx of      Hypertension No family hx of      Hyperlipidemia No family hx of      CEREBROVASCULAR DISEASE No family hx of      Breast Cancer No family hx of      Colon Cancer No family hx of      Other Cancer No family hx of      Depression No family hx of      Anxiety Disorder No family hx of      MENTAL ILLNESS No family hx of      Substance Abuse No family hx of      Anesthesia Reaction No family hx of      Asthma No family hx of      OSTEOPOROSIS No family hx of      Genetic Disorder No family hx of      Obesity No family hx of          Review of Systems:  Skin: itching  Eyes: vision changes and headache  Ears/Nose/Throat: negative  Respiratory: No shortness of breath, dyspnea on exertion, cough, or hemoptysis  Cardiovascular: HTN   Gastrointestinal: abdominal pain, constipation and diarrhea  Genitourinary: urgency, incontinence  Musculoskeletal: back pain, neck pain and joint pain  Neurologic: negative  Psychiatric: excessive stress and depression  Hematologic/Lymphatic/Immunologic: negative  Endocrine: negative    Physical Exam:  Vitals:    07/19/17 1354   BP: (!) 165/99   Pulse: 71   Weight: 82.1 kg (181 lb)   Height: 1.6 m (5' 3\")     Exam:  Constitutional: healthy, alert and no distress  Head: normocephalic. Atraumatic.   Eyes: no redness or jaundice noted   ENT: oropharnx normal.  MMM.  Neck supple.    Cardiovascular: RRR no m/g/r   Respiratory: clear to auscultation A/P. Patient able to speak in full sentences without cough or shortness of breath noted. Respirations easy unlabored  Gastrointestinal: soft, non-tender, normoactive bowel " sounds   : deferred  Skin: no suspicious lesions or rashes  Psychiatric: mentation appears normal and affect normal/bright    Musculoskeletal exam:  Gait/Station/Posture: fair posture  Normal gait. Able to rise onto toes and heels. Able to perform tandem gait    Cervical spine:    Flex:  40 degrees   Ext: 20 degrees   Rotation to right: 90 degrees   Rotation to left: 90degrees       Thoracic spine:  Normal     Lumbar spine:    Flex:  90 degrees   Ext: 20 degrees   Rotation/ext to right: painful    Rotation/ext to left: painful    SI joints: non-tender   Greater trochanters: non-tender    Myofascial tenderness:  Left flank tenderness  Straight leg exam: negative  Joseph's maneuver: negative    Neurologic exam:  CN:  Cranial nerves 2-12 are  Grossly normal  Motor:  5/5 UE and LE strength  Reflexes:     Biceps:     R:  2+/4 L: 2+/4   Brachioradialis   R:  2+/4 L: 2+/4      Patella:  R:  2+/4 L: 2+/4   Achilles:  R:  2+/4 L: 2+/4  Other reflexes:  Toes downgoing   Sensory:  (upper and lower extremities):   Light touch: normal    Vibration: normal    Pin prick: normal    Allodynia: absent    Dysethesia: absent    Hyperalgesia: absent     Diagnostic tests:  EXAMINATION: CT ABDOMEN PELVIS W/O CONTRAST, 3/15/2017 2:21 PM     TECHNIQUE:  Helical CT images from the lung bases through the pubic  symphysis were obtained  without IV contrast.      COMPARISON: CT 10/4/2016. Ultrasound 3/10/2017.     HISTORY: Unspecified abdominal pain     FINDINGS:     Abdomen and pelvis: 7 x 4 mm nonobstructing stone in the interpolar  region of the left kidney (series 2 image 32) is largely unchanged  from comparison CT. Punctate adjacent stone fragments are again noted.  No stones in the right kidney. No hydronephrosis on either side.  Asymmetric ureteral ectasia on the left without an identified ureteral  stone. There is associated mild periureteral stranding. Urinary  bladder appears normal without stones. Gynecologic structures  appear  normal. No adnexal mass.     No abnormality within the unenhanced liver, gallbladder, spleen,  adrenal glands, or pancreas.     Stomach and duodenum appear normal. Small and large bowel are normal  in caliber. Normal appendix. No significant diverticular disease.     No lymphadenopathy. Descending aorta is normal in caliber. No ascites.     Lung bases:  No concerning pulmonary nodules. No pleural or  pericardial effusion.     Bones and soft tissues: No concerning bone or soft tissue lesions.         IMPRESSION:   1. Asymmetric left ureteral ectasia with subtle periureteral  stranding. No obstructing ureteral stone is seen. Findings could be  secondary to a recently passed stone. No hydronephrosis.  2. 7 x 4 mm nonobstructing stone in the interpolar region of the left  kidney with several smaller adjacent stone fragments.  3. No stones in the right kidney. No right hydronephrosis    Other testing (labs, diagnostics):  3/15/2017  Cr. 0.69  Est GFR >90      Screening tools:    PHQ-9 score today is 19 . Patient endorses passive suicidal ideation. States she would not act on these feelings.      DIRE Score for ongoing opioid management is calculated as follows:    Diagnosis = 2    Intractability = 2    Risk: Psych = 1-2  Chem Hlth = 1-2  Reliability = 2  Social = 2    Efficacy = 2    Total DIRE Score = 12-14 l (14 or higher predicts good candidate for ongoing opioid management; 13 or lower predicts poor candidate for opioid management)     Assessment:  1.   2. PMHx incl left flank pain  3. History of renal calculi  4. Chronic left-sided low back pain without radicular symptoms  5. Lumbosacral spondylosis   6. PMHX includes: kidney stones and HTN  7. PSHx includes: 13 kidney procedures for kidney stones (last 2017), percutaneous nephrolithotomy (), hysterectomy,  x 2        Plan:  Diagnosis reviewed, treatment option addressed, and risk/benefits discussed.  Self-care instructions given.  I am  recommending a multidisciplinary treatment plan to help this patient better manage her pain.      1. Physical Therapy: not at present  2. Clinical Health Psychologist to address issues of relaxation, behavioral change, coping style, and other factors important to impscheduled follow-up with Daniel Ramirez in health psychology  3. Introductory groups: not ordered   4. Diagnostic Stuobtain lumbar MRI and plain, call [phone]  To schedule  5. Medication Management:   1. Methocarbamol as needed for muscle spasms, cautioned sedation  2. Gabapentin as directed  3. Start methocarbamol first, 3 days before patient starts gabapentin  6. Further procedures recommended: none  7. Acupuncture: none  8. Urine toxicology screen today: none   9. Recommendations/follow-up for PCP:  See above  10. Release of information: none  11. Follow up: 8-10 weeks    Total time spent was 65 minutes, and more than 50% of face to face time was spent in counseling and/or coordination of care regarding principles of multidisciplinary care, medication management.      Rafaela RECINOS, RN CNP, FNP  [facility]
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
## 2017-07-20 ASSESSMENT — PATIENT HEALTH QUESTIONNAIRE - PHQ9: SUM OF ALL RESPONSES TO PHQ QUESTIONS 1-9: 19

## 2017-08-02 ENCOUNTER — RADIANT APPOINTMENT (OUTPATIENT)
Dept: MRI IMAGING | Facility: CLINIC | Age: 57
End: 2017-08-02
Attending: NURSE PRACTITIONER
Payer: MEDICAID

## 2017-08-02 DIAGNOSIS — Z87.442 H/O RENAL CALCULI: ICD-10-CM

## 2017-08-02 DIAGNOSIS — M47.817 LUMBOSACRAL SPONDYLOSIS WITHOUT MYELOPATHY: ICD-10-CM

## 2017-08-02 DIAGNOSIS — G89.29 CHRONIC LEFT-SIDED LOW BACK PAIN WITHOUT SCIATICA: ICD-10-CM

## 2017-08-02 DIAGNOSIS — M54.50 CHRONIC LEFT-SIDED LOW BACK PAIN WITHOUT SCIATICA: ICD-10-CM

## 2017-08-02 DIAGNOSIS — R10.9 LEFT FLANK PAIN: ICD-10-CM

## 2017-08-02 PROCEDURE — 72148 MRI LUMBAR SPINE W/O DYE: CPT | Mod: TC

## 2017-08-15 DIAGNOSIS — G89.29 CHRONIC LEFT-SIDED LOW BACK PAIN WITHOUT SCIATICA: ICD-10-CM

## 2017-08-15 DIAGNOSIS — M79.18 MYOFASCIAL PAIN: ICD-10-CM

## 2017-08-15 DIAGNOSIS — Z87.442 H/O RENAL CALCULI: ICD-10-CM

## 2017-08-15 DIAGNOSIS — M54.50 CHRONIC LEFT-SIDED LOW BACK PAIN WITHOUT SCIATICA: ICD-10-CM

## 2017-08-15 DIAGNOSIS — R10.9 LEFT FLANK PAIN: ICD-10-CM

## 2017-08-15 DIAGNOSIS — M47.817 LUMBOSACRAL SPONDYLOSIS WITHOUT MYELOPATHY: ICD-10-CM

## 2017-08-15 NOTE — TELEPHONE ENCOUNTER
Received fax request from [facility]  pharmacy requesting refill(s) for gabapentin (NEURONTIN) 300 MG capsule    Last refilled on 7/19/17    Pt last seen on 7/19/17  Next appt scheduled for 10/20/17    Tati Do MA  [facility]      Will facilitate refill.

## 2017-08-16 RX ORDER — GABAPENTIN 300 MG/1
CAPSULE ORAL
Qty: 120 CAPSULE | Refills: 1 | Status: ON HOLD | OUTPATIENT
Start: 2017-08-16 | End: 2018-03-15

## 2017-08-16 NOTE — TELEPHONE ENCOUNTER
Signed Prescriptions:                        Disp   Refills    gabapentin (NEURONTIN) 300 MG capsule      120 ca*1        Sig: take 300mg in the morning and afternoon and 600mg at           bedtime. If side effects, then reduce to last           tolerable dosage.  Authorizing Provider: ALFREDO MANZANARES, RN CNP, FNP  [facility] [place]

## 2017-08-18 ENCOUNTER — TELEPHONE (OUTPATIENT)
Dept: PALLIATIVE MEDICINE | Facility: CLINIC | Age: 57
End: 2017-08-18

## 2017-08-18 NOTE — TELEPHONE ENCOUNTER
Pt had a lumbar MRI on 8/2/17.    Pt does NOT look at her mychart.    Routed to Rafaela Tejada NP to review.    Lilly Valles RN-BSN  [facility]

## 2017-08-18 NOTE — TELEPHONE ENCOUNTER
Patient calling for MRI results of her back.   She is available before 3pm to speak, daily.     Jazmine JENNINGS    [facility] [place]

## 2017-08-21 NOTE — TELEPHONE ENCOUNTER
Rafaela Tejada NP out of the office this week.    Routed to provider pool to review pt's MRI results.    Lilly Valles RN-BSN  [facility]

## 2017-08-21 NOTE — TELEPHONE ENCOUNTER
Please let patient know that she has not reviewed various things on Mychart, and she should be checking there as well.    MRI doesn't show any significant abnormality or concern.    Katrin Sweet MD  [facility]

## 2017-08-23 NOTE — TELEPHONE ENCOUNTER
Called pt and relayed Dr. Sweet's response.  She doesn't use mychart.    Lilly Valles RN-BSN  [facility] [place]

## 2018-03-15 ENCOUNTER — APPOINTMENT (OUTPATIENT)
Dept: CT IMAGING | Facility: CLINIC | Age: 58
End: 2018-03-15
Attending: EMERGENCY MEDICINE
Payer: COMMERCIAL

## 2018-03-15 ENCOUNTER — PRE VISIT (OUTPATIENT)
Dept: UROLOGY | Facility: CLINIC | Age: 58
End: 2018-03-15

## 2018-03-15 ENCOUNTER — HOSPITAL ENCOUNTER (INPATIENT)
Facility: CLINIC | Age: 58
LOS: 2 days | Discharge: HOME | End: 2018-03-17
Attending: EMERGENCY MEDICINE | Admitting: STUDENT IN AN ORGANIZED HEALTH CARE EDUCATION/TRAINING PROGRAM
Payer: COMMERCIAL

## 2018-03-15 DIAGNOSIS — K85.90 ACUTE PANCREATITIS, UNSPECIFIED COMPLICATION STATUS, UNSPECIFIED PANCREATITIS TYPE: ICD-10-CM

## 2018-03-15 DIAGNOSIS — N28.89: Primary | ICD-10-CM

## 2018-03-15 DIAGNOSIS — I10 BENIGN ESSENTIAL HYPERTENSION: ICD-10-CM

## 2018-03-15 LAB
ALBUMIN SERPL-MCNC: 3.9 G/DL (ref 3.4–5)
ALBUMIN UR-MCNC: 10 MG/DL
ALBUMIN UR-MCNC: 30 MG/DL
ALP SERPL-CCNC: 115 U/L (ref 40–150)
ALT SERPL W P-5'-P-CCNC: 24 U/L (ref 0–50)
AMYLASE SERPL-CCNC: 171 U/L (ref 30–110)
ANION GAP SERPL CALCULATED.3IONS-SCNC: 10 MMOL/L (ref 3–14)
APPEARANCE UR: ABNORMAL
APPEARANCE UR: CLEAR
AST SERPL W P-5'-P-CCNC: 15 U/L (ref 0–45)
BACTERIA #/AREA URNS HPF: ABNORMAL /HPF
BACTERIA #/AREA URNS HPF: ABNORMAL /HPF
BASOPHILS # BLD AUTO: 0 10E9/L (ref 0–0.2)
BASOPHILS NFR BLD AUTO: 0.2 %
BILIRUB SERPL-MCNC: 0.4 MG/DL (ref 0.2–1.3)
BILIRUB UR QL STRIP: NEGATIVE
BILIRUB UR QL STRIP: NEGATIVE
BUN SERPL-MCNC: 12 MG/DL (ref 7–30)
CALCIUM SERPL-MCNC: 8.9 MG/DL (ref 8.5–10.1)
CHLORIDE SERPL-SCNC: 105 MMOL/L (ref 94–109)
CO2 SERPL-SCNC: 24 MMOL/L (ref 20–32)
COLOR UR AUTO: YELLOW
COLOR UR AUTO: YELLOW
CREAT SERPL-MCNC: 0.71 MG/DL (ref 0.52–1.04)
CRP SERPL-MCNC: 7.6 MG/L (ref 0–8)
DIFFERENTIAL METHOD BLD: ABNORMAL
EOSINOPHIL # BLD AUTO: 0.1 10E9/L (ref 0–0.7)
EOSINOPHIL NFR BLD AUTO: 1.3 %
ERYTHROCYTE [DISTWIDTH] IN BLOOD BY AUTOMATED COUNT: 14 % (ref 10–15)
GFR SERPL CREATININE-BSD FRML MDRD: 85 ML/MIN/1.7M2
GLUCOSE SERPL-MCNC: 101 MG/DL (ref 70–99)
GLUCOSE UR STRIP-MCNC: NEGATIVE MG/DL
GLUCOSE UR STRIP-MCNC: NEGATIVE MG/DL
HCT VFR BLD AUTO: 36.7 % (ref 35–47)
HGB BLD-MCNC: 12.2 G/DL (ref 11.7–15.7)
HGB UR QL STRIP: NEGATIVE
HGB UR QL STRIP: NEGATIVE
IMM GRANULOCYTES # BLD: 0 10E9/L (ref 0–0.4)
IMM GRANULOCYTES NFR BLD: 0.1 %
KETONES UR STRIP-MCNC: NEGATIVE MG/DL
KETONES UR STRIP-MCNC: NEGATIVE MG/DL
LACTATE BLD-SCNC: 0.9 MMOL/L (ref 0.7–2)
LEUKOCYTE ESTERASE UR QL STRIP: NEGATIVE
LEUKOCYTE ESTERASE UR QL STRIP: NEGATIVE
LIPASE SERPL-CCNC: 1058 U/L (ref 73–393)
LYMPHOCYTES # BLD AUTO: 3.8 10E9/L (ref 0.8–5.3)
LYMPHOCYTES NFR BLD AUTO: 36.5 %
MCH RBC QN AUTO: 28.6 PG (ref 26.5–33)
MCHC RBC AUTO-ENTMCNC: 33.2 G/DL (ref 31.5–36.5)
MCV RBC AUTO: 86 FL (ref 78–100)
MONOCYTES # BLD AUTO: 0.6 10E9/L (ref 0–1.3)
MONOCYTES NFR BLD AUTO: 5.6 %
MUCOUS THREADS #/AREA URNS LPF: PRESENT /LPF
MUCOUS THREADS #/AREA URNS LPF: PRESENT /LPF
NEUTROPHILS # BLD AUTO: 5.9 10E9/L (ref 1.6–8.3)
NEUTROPHILS NFR BLD AUTO: 56.3 %
NITRATE UR QL: NEGATIVE
NITRATE UR QL: NEGATIVE
NRBC # BLD AUTO: 0 10*3/UL
NRBC BLD AUTO-RTO: 0 /100
PH UR STRIP: 6.5 PH (ref 5–7)
PH UR STRIP: 7 PH (ref 5–7)
PLATELET # BLD AUTO: 493 10E9/L (ref 150–450)
POTASSIUM SERPL-SCNC: 3.7 MMOL/L (ref 3.4–5.3)
PROCALCITONIN SERPL-MCNC: <0.05 NG/ML
PROT SERPL-MCNC: 8 G/DL (ref 6.8–8.8)
RBC # BLD AUTO: 4.27 10E12/L (ref 3.8–5.2)
RBC #/AREA URNS AUTO: 2 /HPF (ref 0–2)
RBC #/AREA URNS AUTO: 2 /HPF (ref 0–2)
SODIUM SERPL-SCNC: 140 MMOL/L (ref 133–144)
SOURCE: ABNORMAL
SOURCE: ABNORMAL
SP GR UR STRIP: 1.01 (ref 1–1.03)
SP GR UR STRIP: 1.02 (ref 1–1.03)
SQUAMOUS #/AREA URNS AUTO: 21 /HPF (ref 0–1)
SQUAMOUS #/AREA URNS AUTO: 9 /HPF (ref 0–1)
TROPONIN I SERPL-MCNC: <0.015 UG/L (ref 0–0.04)
UROBILINOGEN UR STRIP-MCNC: 2 MG/DL (ref 0–2)
UROBILINOGEN UR STRIP-MCNC: NORMAL MG/DL (ref 0–2)
WBC # BLD AUTO: 10.5 10E9/L (ref 4–11)
WBC #/AREA URNS AUTO: 1 /HPF (ref 0–5)
WBC #/AREA URNS AUTO: 6 /HPF (ref 0–5)

## 2018-03-15 PROCEDURE — 96374 THER/PROPH/DIAG INJ IV PUSH: CPT | Performed by: EMERGENCY MEDICINE

## 2018-03-15 PROCEDURE — 25000128 H RX IP 250 OP 636: Performed by: EMERGENCY MEDICINE

## 2018-03-15 PROCEDURE — 74177 CT ABD & PELVIS W/CONTRAST: CPT

## 2018-03-15 PROCEDURE — 87591 N.GONORRHOEAE DNA AMP PROB: CPT | Performed by: PHYSICIAN ASSISTANT

## 2018-03-15 PROCEDURE — 96375 TX/PRO/DX INJ NEW DRUG ADDON: CPT | Performed by: EMERGENCY MEDICINE

## 2018-03-15 PROCEDURE — 25000128 H RX IP 250 OP 636: Performed by: PHYSICIAN ASSISTANT

## 2018-03-15 PROCEDURE — 93010 ELECTROCARDIOGRAM REPORT: CPT | Performed by: INTERNAL MEDICINE

## 2018-03-15 PROCEDURE — 25000128 H RX IP 250 OP 636: Performed by: STUDENT IN AN ORGANIZED HEALTH CARE EDUCATION/TRAINING PROGRAM

## 2018-03-15 PROCEDURE — 25000125 ZZHC RX 250: Performed by: STUDENT IN AN ORGANIZED HEALTH CARE EDUCATION/TRAINING PROGRAM

## 2018-03-15 PROCEDURE — 80053 COMPREHEN METABOLIC PANEL: CPT | Performed by: EMERGENCY MEDICINE

## 2018-03-15 PROCEDURE — 96361 HYDRATE IV INFUSION ADD-ON: CPT | Performed by: EMERGENCY MEDICINE

## 2018-03-15 PROCEDURE — 12000008 ZZH R&B INTERMEDIATE UMMC

## 2018-03-15 PROCEDURE — 83690 ASSAY OF LIPASE: CPT | Performed by: EMERGENCY MEDICINE

## 2018-03-15 PROCEDURE — 27210995 ZZH RX 272: Performed by: PHYSICIAN ASSISTANT

## 2018-03-15 PROCEDURE — 99285 EMERGENCY DEPT VISIT HI MDM: CPT | Mod: 25 | Performed by: EMERGENCY MEDICINE

## 2018-03-15 PROCEDURE — 93005 ELECTROCARDIOGRAM TRACING: CPT

## 2018-03-15 PROCEDURE — 85025 COMPLETE CBC W/AUTO DIFF WBC: CPT | Performed by: EMERGENCY MEDICINE

## 2018-03-15 PROCEDURE — 82150 ASSAY OF AMYLASE: CPT | Performed by: EMERGENCY MEDICINE

## 2018-03-15 PROCEDURE — 25000132 ZZH RX MED GY IP 250 OP 250 PS 637: Performed by: PHYSICIAN ASSISTANT

## 2018-03-15 PROCEDURE — 99223 1ST HOSP IP/OBS HIGH 75: CPT | Mod: AI | Performed by: STUDENT IN AN ORGANIZED HEALTH CARE EDUCATION/TRAINING PROGRAM

## 2018-03-15 PROCEDURE — 25000132 ZZH RX MED GY IP 250 OP 250 PS 637: Performed by: EMERGENCY MEDICINE

## 2018-03-15 PROCEDURE — 76705 ECHO EXAM OF ABDOMEN: CPT | Performed by: EMERGENCY MEDICINE

## 2018-03-15 PROCEDURE — 87086 URINE CULTURE/COLONY COUNT: CPT | Performed by: EMERGENCY MEDICINE

## 2018-03-15 PROCEDURE — 84484 ASSAY OF TROPONIN QUANT: CPT | Performed by: EMERGENCY MEDICINE

## 2018-03-15 PROCEDURE — 81001 URINALYSIS AUTO W/SCOPE: CPT | Performed by: EMERGENCY MEDICINE

## 2018-03-15 PROCEDURE — 87491 CHLMYD TRACH DNA AMP PROBE: CPT | Performed by: PHYSICIAN ASSISTANT

## 2018-03-15 PROCEDURE — 76705 ECHO EXAM OF ABDOMEN: CPT | Mod: 26 | Performed by: EMERGENCY MEDICINE

## 2018-03-15 PROCEDURE — 86140 C-REACTIVE PROTEIN: CPT | Performed by: EMERGENCY MEDICINE

## 2018-03-15 PROCEDURE — 99207 ZZC APP CREDIT; MD BILLING SHARED VISIT: CPT | Performed by: PHYSICIAN ASSISTANT

## 2018-03-15 PROCEDURE — 83605 ASSAY OF LACTIC ACID: CPT | Performed by: EMERGENCY MEDICINE

## 2018-03-15 PROCEDURE — 81001 URINALYSIS AUTO W/SCOPE: CPT | Performed by: PHYSICIAN ASSISTANT

## 2018-03-15 PROCEDURE — 84145 PROCALCITONIN (PCT): CPT | Performed by: EMERGENCY MEDICINE

## 2018-03-15 RX ORDER — HYDRALAZINE HYDROCHLORIDE 20 MG/ML
10 INJECTION INTRAMUSCULAR; INTRAVENOUS EVERY 6 HOURS PRN
Status: DISCONTINUED | OUTPATIENT
Start: 2018-03-15 | End: 2018-03-17 | Stop reason: HOSPADM

## 2018-03-15 RX ORDER — BISACODYL 5 MG
15 TABLET, DELAYED RELEASE (ENTERIC COATED) ORAL DAILY PRN
Status: DISCONTINUED | OUTPATIENT
Start: 2018-03-15 | End: 2018-03-17 | Stop reason: HOSPADM

## 2018-03-15 RX ORDER — IOPAMIDOL 755 MG/ML
111 INJECTION, SOLUTION INTRAVASCULAR ONCE
Status: COMPLETED | OUTPATIENT
Start: 2018-03-15 | End: 2018-03-15

## 2018-03-15 RX ORDER — OXYCODONE HYDROCHLORIDE 5 MG/1
5-10 TABLET ORAL EVERY 4 HOURS PRN
Status: DISCONTINUED | OUTPATIENT
Start: 2018-03-15 | End: 2018-03-17 | Stop reason: HOSPADM

## 2018-03-15 RX ORDER — LISINOPRIL/HYDROCHLOROTHIAZIDE 10-12.5 MG
1 TABLET ORAL DAILY
Status: DISCONTINUED | OUTPATIENT
Start: 2018-03-15 | End: 2018-03-15

## 2018-03-15 RX ORDER — LISINOPRIL/HYDROCHLOROTHIAZIDE 10-12.5 MG
1 TABLET ORAL DAILY
Status: ON HOLD | COMMUNITY
End: 2018-03-17

## 2018-03-15 RX ORDER — NALOXONE HYDROCHLORIDE 0.4 MG/ML
.1-.4 INJECTION, SOLUTION INTRAMUSCULAR; INTRAVENOUS; SUBCUTANEOUS
Status: DISCONTINUED | OUTPATIENT
Start: 2018-03-15 | End: 2018-03-17 | Stop reason: HOSPADM

## 2018-03-15 RX ORDER — DIPHENHYDRAMINE HCL 50 MG
50 CAPSULE ORAL ONCE
Status: COMPLETED | OUTPATIENT
Start: 2018-03-15 | End: 2018-03-15

## 2018-03-15 RX ORDER — BISACODYL 5 MG
5 TABLET, DELAYED RELEASE (ENTERIC COATED) ORAL DAILY PRN
Status: DISCONTINUED | OUTPATIENT
Start: 2018-03-15 | End: 2018-03-17 | Stop reason: HOSPADM

## 2018-03-15 RX ORDER — KETOROLAC TROMETHAMINE 30 MG/ML
30 INJECTION, SOLUTION INTRAMUSCULAR; INTRAVENOUS ONCE
Status: COMPLETED | OUTPATIENT
Start: 2018-03-15 | End: 2018-03-15

## 2018-03-15 RX ORDER — BISACODYL 5 MG
10 TABLET, DELAYED RELEASE (ENTERIC COATED) ORAL DAILY PRN
Status: DISCONTINUED | OUTPATIENT
Start: 2018-03-15 | End: 2018-03-17 | Stop reason: HOSPADM

## 2018-03-15 RX ORDER — LIDOCAINE 4 G/G
1 PATCH TOPICAL
Status: DISCONTINUED | OUTPATIENT
Start: 2018-03-15 | End: 2018-03-17 | Stop reason: HOSPADM

## 2018-03-15 RX ORDER — PANTOPRAZOLE SODIUM 40 MG/1
40 TABLET, DELAYED RELEASE ORAL EVERY MORNING
Status: DISCONTINUED | OUTPATIENT
Start: 2018-03-15 | End: 2018-03-17 | Stop reason: HOSPADM

## 2018-03-15 RX ORDER — SODIUM CHLORIDE, SODIUM LACTATE, POTASSIUM CHLORIDE, CALCIUM CHLORIDE 600; 310; 30; 20 MG/100ML; MG/100ML; MG/100ML; MG/100ML
INJECTION, SOLUTION INTRAVENOUS CONTINUOUS
Status: CANCELLED | OUTPATIENT
Start: 2018-03-15

## 2018-03-15 RX ORDER — LIDOCAINE 40 MG/G
CREAM TOPICAL
Status: DISCONTINUED | OUTPATIENT
Start: 2018-03-15 | End: 2018-03-17 | Stop reason: HOSPADM

## 2018-03-15 RX ORDER — AMOXICILLIN 250 MG
2 CAPSULE ORAL AT BEDTIME
Status: DISCONTINUED | OUTPATIENT
Start: 2018-03-15 | End: 2018-03-17 | Stop reason: HOSPADM

## 2018-03-15 RX ORDER — DOCUSATE SODIUM 100 MG/1
100 CAPSULE, LIQUID FILLED ORAL 2 TIMES DAILY
Status: DISCONTINUED | OUTPATIENT
Start: 2018-03-15 | End: 2018-03-17 | Stop reason: HOSPADM

## 2018-03-15 RX ORDER — NICOTINE 21 MG/24HR
1 PATCH, TRANSDERMAL 24 HOURS TRANSDERMAL DAILY
Status: DISCONTINUED | OUTPATIENT
Start: 2018-03-15 | End: 2018-03-17 | Stop reason: HOSPADM

## 2018-03-15 RX ORDER — BISACODYL 10 MG
10 SUPPOSITORY, RECTAL RECTAL DAILY PRN
Status: DISCONTINUED | OUTPATIENT
Start: 2018-03-15 | End: 2018-03-17 | Stop reason: HOSPADM

## 2018-03-15 RX ORDER — ONDANSETRON 2 MG/ML
4 INJECTION INTRAMUSCULAR; INTRAVENOUS EVERY 6 HOURS PRN
Status: DISCONTINUED | OUTPATIENT
Start: 2018-03-15 | End: 2018-03-17 | Stop reason: HOSPADM

## 2018-03-15 RX ORDER — PHENAZOPYRIDINE HYDROCHLORIDE 100 MG/1
100 TABLET, FILM COATED ORAL
Status: DISCONTINUED | OUTPATIENT
Start: 2018-03-16 | End: 2018-03-17 | Stop reason: HOSPADM

## 2018-03-15 RX ORDER — SODIUM CHLORIDE 9 MG/ML
1000 INJECTION, SOLUTION INTRAVENOUS CONTINUOUS
Status: DISCONTINUED | OUTPATIENT
Start: 2018-03-15 | End: 2018-03-15

## 2018-03-15 RX ORDER — MORPHINE SULFATE 4 MG/ML
4 INJECTION, SOLUTION INTRAMUSCULAR; INTRAVENOUS
Status: DISCONTINUED | OUTPATIENT
Start: 2018-03-15 | End: 2018-03-15

## 2018-03-15 RX ORDER — SODIUM CHLORIDE, SODIUM LACTATE, POTASSIUM CHLORIDE, CALCIUM CHLORIDE 600; 310; 30; 20 MG/100ML; MG/100ML; MG/100ML; MG/100ML
INJECTION, SOLUTION INTRAVENOUS CONTINUOUS
Status: DISCONTINUED | OUTPATIENT
Start: 2018-03-15 | End: 2018-03-17 | Stop reason: HOSPADM

## 2018-03-15 RX ORDER — ONDANSETRON 4 MG/1
4 TABLET, ORALLY DISINTEGRATING ORAL EVERY 6 HOURS PRN
Status: DISCONTINUED | OUTPATIENT
Start: 2018-03-15 | End: 2018-03-17 | Stop reason: HOSPADM

## 2018-03-15 RX ORDER — OXYCODONE HYDROCHLORIDE 100 MG/5ML
5-10 SOLUTION ORAL EVERY 4 HOURS PRN
Status: DISCONTINUED | OUTPATIENT
Start: 2018-03-15 | End: 2018-03-15

## 2018-03-15 RX ORDER — KETOROLAC TROMETHAMINE 30 MG/ML
30 INJECTION, SOLUTION INTRAMUSCULAR; INTRAVENOUS EVERY 6 HOURS PRN
Status: CANCELLED | OUTPATIENT
Start: 2018-03-15 | End: 2018-03-20

## 2018-03-15 RX ORDER — ONDANSETRON 2 MG/ML
4 INJECTION INTRAMUSCULAR; INTRAVENOUS ONCE
Status: COMPLETED | OUTPATIENT
Start: 2018-03-15 | End: 2018-03-15

## 2018-03-15 RX ADMIN — OXYCODONE HYDROCHLORIDE 5 MG: 5 TABLET ORAL at 20:48

## 2018-03-15 RX ADMIN — CEFTRIAXONE SODIUM 1 G: 10 INJECTION, POWDER, FOR SOLUTION INTRAVENOUS at 21:29

## 2018-03-15 RX ADMIN — DIPHENHYDRAMINE HYDROCHLORIDE 50 MG: 50 CAPSULE ORAL at 15:59

## 2018-03-15 RX ADMIN — KETOROLAC TROMETHAMINE 30 MG: 30 INJECTION, SOLUTION INTRAMUSCULAR at 14:00

## 2018-03-15 RX ADMIN — SODIUM CHLORIDE 1000 ML: 9 INJECTION, SOLUTION INTRAVENOUS at 14:00

## 2018-03-15 RX ADMIN — DOCUSATE SODIUM 100 MG: 100 CAPSULE, LIQUID FILLED ORAL at 20:49

## 2018-03-15 RX ADMIN — SODIUM CHLORIDE 1000 ML: 9 INJECTION, SOLUTION INTRAVENOUS at 19:51

## 2018-03-15 RX ADMIN — SODIUM CHLORIDE, POTASSIUM CHLORIDE, SODIUM LACTATE AND CALCIUM CHLORIDE: 600; 310; 30; 20 INJECTION, SOLUTION INTRAVENOUS at 22:06

## 2018-03-15 RX ADMIN — SODIUM CHLORIDE, PRESERVATIVE FREE 78 ML: 5 INJECTION INTRAVENOUS at 15:47

## 2018-03-15 RX ADMIN — NICOTINE 1 PATCH: 14 PATCH, EXTENDED RELEASE TRANSDERMAL at 20:36

## 2018-03-15 RX ADMIN — MORPHINE SULFATE 4 MG: 4 INJECTION, SOLUTION INTRAMUSCULAR; INTRAVENOUS at 16:07

## 2018-03-15 RX ADMIN — ONDANSETRON 4 MG: 2 INJECTION INTRAMUSCULAR; INTRAVENOUS at 14:00

## 2018-03-15 RX ADMIN — LIDOCAINE 1 PATCH: 560 PATCH PERCUTANEOUS; TOPICAL; TRANSDERMAL at 19:55

## 2018-03-15 RX ADMIN — SODIUM CHLORIDE 1000 ML: 9 INJECTION, SOLUTION INTRAVENOUS at 15:21

## 2018-03-15 RX ADMIN — IOPAMIDOL 111 ML: 755 INJECTION, SOLUTION INTRAVENOUS at 15:47

## 2018-03-15 RX ADMIN — PANTOPRAZOLE SODIUM 40 MG: 40 TABLET, DELAYED RELEASE ORAL at 19:56

## 2018-03-15 ASSESSMENT — ENCOUNTER SYMPTOMS
SORE THROAT: 0
CHILLS: 1
FEVER: 0
FLANK PAIN: 1
VOMITING: 1
ABDOMINAL PAIN: 1
HEADACHES: 1
DYSURIA: 0
SHORTNESS OF BREATH: 0
NAUSEA: 1
FREQUENCY: 1
COUGH: 0

## 2018-03-15 ASSESSMENT — ACTIVITIES OF DAILY LIVING (ADL)
BATHING: 0-->INDEPENDENT
TOILETING: 0-->INDEPENDENT
DRESS: 0-->INDEPENDENT
RETIRED_EATING: 0-->INDEPENDENT
SWALLOWING: 0-->SWALLOWS FOODS/LIQUIDS WITHOUT DIFFICULTY
RETIRED_COMMUNICATION: 0-->UNDERSTANDS/COMMUNICATES WITHOUT DIFFICULTY

## 2018-03-15 NOTE — H&P
[facility]    Internal Medicine History and Physical - Gold Service       Date of Admission:  3/15/2018    Assessment & Plan   Sophia Andrew is a 57 year old female  admitted on 3/15/2018. She has PMH of HTN, recurrent Kidney stones s/p multiple procedures (cystoscopy, lithotripsy, and stent placement), chronic left flank/back pain, and tobacco use disorder who presents to the ED with complaints of two week hx of bilateral flank pain L>R, nausea, non-bloody emesis, fever, chills, HA's, epigastric abdominal pain, and constipation. Patient admitted to Medicine for further evaluation and care.      # Bilateral flank pain, nausea, vomiting, fever     Recurrent Brushite stone former     Hx of left calyceal diverticulum (s/p attempt PCNL)    Chronic left flank pain:  Presented with 2 week hx of bilateral L>R flank pain, dysuria, nausea, non-bloody emesis, fever, chills. Reports recent  treatment for suspected UTI 2/21/18- completed 7 day course of ciprofloxacin.  ED evaluation: Cr 0.71, LA 0.9, Lipase 1058, Amylase 171,  LFT's wnl, UA with 6 WBC, moderate bacteria, though 21 squamous epithelial cells- likely contaminated. Tmax 99.9, No leukocytosis. CT A/P with bilateral L>R wedge-shaped renal parenchymal hypodensities- scarring vs pyelonephritis, non obstructing 6mm stone on left, and diverticulosis w/o diverticulitis. No gallstones, or acute cholecystitis on CT, no dilation of the PD and no suspicious lesions. Bedside abdominal US suggestive of biliary sludge without obvious stone or wall thickening. Previous culture data (3/15/17)- coag neg staph, corynebacterium, and gram positive bacilli. Concern for pyelonephritis- will repeat UA vs PUD vs pancreatis (despite lipase < 3x upper limit) vs gastritis vs gynecological process vs cardiac.  - EKG, troponin  - Repeat UA  - Follow UC  - Antibiotics for suspected Pyelonephritis- 1g Ceftriaxone q24h until UC results  - BCx2  - IVF  - Add  CRP and procalcitonin  - PRN Zofran for nausea  - SL oxycodone 5-10 mg q4h PRN for pain  - PPI  - Trial of GI cocktail  - Pulse ox  - ADAT  - Urology consult placed- please discuss with in am       # Constipation: Patient reports straining with BM's for the past few weeks. She has taken laxatives, though does not feel that they are working.   - IVF  - Suppository  - Bowel regimen when tolerating PO intake     # Adult onset Headache: Patient reports that CHA developed a few months ago and have not been formally worked up. Headaches are reported as sharp pain L>R sided, Top of left to occipital region with associated left-sided blurry vision, photo and phonophobia. No hx of seizure, CVA. No new focal neurological sx with HA. No current sx on admission.  - Recommend further w/u- imaging    # Chronic LBP: Has followed with Pain/Palliative in the past (7/2017). No chronic narcotic use, per chart review. No recent Rx upon [place] inquiry. No recent trauma. Pain is located on left above SI joint. Appears MSK in nature.  - Lidoderm patch      # HTN: BP mildly elevated on admission. PTA Lisinopril/HCTZ.  - Received dose in ED. Hold further doses with possibility of pyelonephritis. Resume as able  - PRN hydralazine for SBP greater than 170    # Tobacco use disorder: Current 1ppd smoker.  - Recommend cessation  - Nicotine patch    # HLD: PTA statin.   - Did not order tonight    # Pain Assessment:   Current Pain Score 2/9/2017 2/9/2017 2/9/2017   Patient currently in pain? yes yes yes   Pain score (0-10) - - -   Pain location Abdomen - Back   Pain descriptors - - Dull   - Sophia is experiencing pain due to A/c left-sided low back pain, and acute bilateral flank pain. Pain management was discussed and the plan was created in a collaborative fashion.  Sophia's response to the current recommendations: engaged  - Please see the plan for pain management as documented above        Diet: Advance Diet as Tolerated: Clear Liquid  DietADAT  Fluids: NS @ 125 cc/hr  DVT Prophylaxis: Pneumatic Compression Devices  Code Status: Prior    Disposition Plan   Expected discharge: 2 - 3 days; recommended to prior living arrangement once adequate pain management/ tolerating PO medications, Urology consult.     Entered: Radha Price 03/15/2018, 7:12 PM   Information in the above section will display in the discharge planner report.    The patient's care was discussed with the Attending Physician, Dr. Heaton.    Radha Price PA-C  Internal Medicine Hospitalist Service  [facility]  Pager: [phone]    Please see sticky note for cross cover information  ______________________________________________________________________    Chief Complaint   Abdominal pain, flank pain, N/V, fever, chills    History is obtained from the patient and EMR    History of Present Illness   Sophia Andrew is a 57 year old female with PMH as outlined above who presented to the EDwith complaints o ~ 2 week hx of bilateral flank pain L>R, dysuria, fever, chills, epigastric abdominal pain with radiation to bilateral upper quadrants, and constipation.       Patient reports that she was in her usual state of health until ~ 1 months ago when she presented to her PCP with complains of flank pain, abdominal pain and HA. Patient received a 7 day course of cipro, Zofran for nausea, and Norco. She initially reports that she felt better and sx improved with the aforementioned, though ~ 2 weeks ago sx of flank pain, dysuria, fever, chills, epigastric abdominal pain, nausea and non-bloody emesis occurred. Patient denies any new medications, sick contacts, CP, palpitations, sob, roberts, LE edema, cough, hx of PUD, GI bleed, hematuria, foul smelling urine or vaginal discharge. Sx are reported as similar to previous episodes of renal stones, though sx have persisted for a longer period of time. Flank pain, nausea, emesis, and epigastric pain are independent of PO  intake. Epigastric pain is reported to be mildly alleviated by cool liquids. No night sweats, unintentional weight loss. Etoh use 2 x monthly. No family hx of pancreatic disease. Patient also notes that she has been experiencing constipation for the past two weeks. Laxative medication has not been helpful. Likely 2/2 recent narcotic use and poor PO intake.       Currently, patient with improvement in sx after receiving IVF, antiemetics, and IV morphine.     Patient does not endorse current: headaches, changes in vision, chest pain, palpitations, upper respiratory symptoms of rhinorrhea or congestion, shortness of breath, cough, sputum production, wheezing, nausea, emesis, diarrhea, dysuria, edema, rashes, weakness, focal neurologic deficits, recent travel, illness, fever, chills.             Review of Systems   The 10 point Review of Systems is negative other than noted in the HPI or here.     Past Medical History    I have reviewed this patient's medical history and updated it with pertinent information if needed.   Past Medical History:   Diagnosis Date     Chronic left flank pain      Hypertension      Kidney stones 2011    nephrostomy tube     Tobacco abuse disorder         Past Surgical History   I have reviewed this patient's surgical history and updated it with pertinent information if needed.  Past Surgical History:   Procedure Laterality Date      SECTION  X2      SECTION      x2     COMBINED CYSTOSCOPY, INSERT STENT URETER(S)  1/15/2014    Procedure: COMBINED CYSTOSCOPY, INSERT STENT URETER(S);  Cystoscopy, Right ureteral stent placement;  Surgeon: Patience Coy MD;  [facility]     COMBINED CYSTOSCOPY, INSERT STENT URETER(S) Left 2015    Procedure: COMBINED CYSTOSCOPY, INSERT STENT URETER(S);  Surgeon: Marisol Grubbs MD;  [facility]     COMBINED CYSTOSCOPY, RETROGRADES, URETEROSCOPY, LASER HOLMIUM LITHOTRIPSY URETER(S), INSERT STENT Left 2015     Procedure: COMBINED CYSTOSCOPY, RETROGRADES, URETEROSCOPY, LASER HOLMIUM LITHOTRIPSY URETER(S), INSERT STENT;  Surgeon: Mayte Purcell MD;  [facility]     COMBINED CYSTOSCOPY, RETROGRADES, URETEROSCOPY, LASER HOLMIUM LITHOTRIPSY URETER(S), INSERT STENT Left 9/9/2015    Procedure: COMBINED CYSTOSCOPY, RETROGRADES, URETEROSCOPY, LASER HOLMIUM LITHOTRIPSY URETER(S), INSERT STENT;  Surgeon: Mayte Purcell MD;  [facility]     COMBINED CYSTOSCOPY, RETROGRADES, URETEROSCOPY, LASER HOLMIUM LITHOTRIPSY URETER(S), INSERT STENT N/A 2/9/2017    Procedure: COMBINED CYSTOSCOPY, RETROGRADES, URETEROSCOPY, LASER HOLMIUM LITHOTRIPSY URETER(S), INSERT STENT;  Surgeon: Daniel Galeano MD;  [facility]     CYSTOSCOPY, RETROGRADES, INSERT STENT URETER(S), COMBINED Left 8/9/2016    Procedure: COMBINED CYSTOSCOPY, RETROGRADES, INSERT STENT URETER(S);  Surgeon: Daniel Galeano MD;  [facility]     CYSTOSCOPY, URETEROSCOPY, COMBINED  4/14/2011    Procedure:COMBINED CYSTOSCOPY, URETEROSCOPY; Holmium Laser Lithotripsy ; Surgeon:MAYTE PURCELL; [facility]     EXTRACORPOREAL SHOCK WAVE LITHOTRIPSY (ESWL)      right kidney     GYN SURGERY       HYSTERECTOMY       LASER HOLMIUM LITHOTRIPSY URETER(S), INSERT STENT, COMBINED  5/5/2011    Procedure:COMBINED CYSTOSCOPY, URETEROSCOPY, LASER HOLMIUM LITHOTRIPSY URETER(S), INSERT STENT; Surgeon:MAYTE UPRCELL; [facility]     LASER HOLMIUM LITHOTRIPSY URETER(S), INSERT STENT, COMBINED  1/29/2014    Procedure: COMBINED CYSTOSCOPY, URETEROSCOPY, LASER HOLMIUM LITHOTRIPSY URETER(S), INSERT STENT;  Cystoscopy, Right Ureteroscopy With Holmium Laser, Right Stent Placement  ;  Surgeon: Mayte Purcell MD;  [facility]     LASER HOLMIUM LITHOTRIPSY URETER(S), INSERT STENT, COMBINED Left 6/17/2015    Procedure: COMBINED CYSTOSCOPY, URETEROSCOPY, LASER HOLMIUM LITHOTRIPSY URETER(S), INSERT STENT;  Surgeon: Mayte Purcell MD;  [facility]     LASER HOLMIUM LITHOTRIPSY URETER(S), INSERT STENT, COMBINED Bilateral 8/6/2015    Procedure: COMBINED CYSTOSCOPY, URETEROSCOPY, LASER HOLMIUM LITHOTRIPSY URETER(S), INSERT STENT;  Surgeon: Mayte Purcell MD;  [facility]     LASER HOLMIUM LITHOTRIPSY URETER(S), INSERT STENT, COMBINED Left 8/19/2015    Procedure: COMBINED CYSTOSCOPY, URETEROSCOPY, LASER HOLMIUM LITHOTRIPSY URETER(S), INSERT STENT;  Surgeon: Mayte Purcell MD;  [facility]     LASER HOLMIUM LITHOTRIPSY URETER(S), INSERT STENT, COMBINED Left 8/18/2016    Procedure: COMBINED CYSTOSCOPY, URETEROSCOPY, LASER HOLMIUM LITHOTRIPSY URETER(S), INSERT STENT;  Surgeon: Daniel Galeano MD;  [facility]     LASER HOLMIUM NEPHROLITHOTOMY VIA PERCUTANEOUS NEPHROSTOMY  10/20/2011    Procedure:LASER HOLMIUM NEPHROLITHOTOMY VIA PERCUTANEOUS NEPHROSTOMY; Left Holmium Laser of a Caliceal Diverticulum, Stent Placement with Balloon Dilatation, Ureteroscopy, Cystoscopy.; Surgeon:MAYTE PURCELL; [facility]     LASER HOLMIUM NEPHROLITHOTOMY VIA PERCUTANEOUS NEPHROSTOMY  5/5/2011    Procedure:LASER HOLMIUM NEPHROLITHOTOMY VIA PERCUTANEOUS NEPHROSTOMY; Ureteroscopy, retrograde pylogram, left stent placement.  laser not used, laser setup and not used.; Surgeon:MAYTE PURCELL; [facility]     PERCUTANEOUS NEPHROLITHOTOMY  4/14/2011    Procedure:PERCUTANEOUS NEPHROLITHOTOMY; Surgeon:MAYTE PURCELL; [facility]        Social History   Social History   Substance Use Topics     Smoking status: Current Every Day Smoker     Packs/day: 1.00     Years: 40.00     Types: Cigarettes     Smokeless tobacco: Never Used     Alcohol use 0.6 - 1.2 oz/week     0 Standard drinks or equivalent, 1 - 2 Glasses of wine per week      Comment: monthly use       Family History   I have reviewed this patient's family history and updated it with pertinent information if needed.   Family History   Problem Relation Age of Onset     DIABETES  Sister      DIABETES Mother      DIABETES Other      DIABETES Maternal Grandmother      DIABETES Maternal Grandfather      Prostate Cancer Father      Thyroid Disease Mother      Coronary Artery Disease No family hx of      Hypertension No family hx of      Hyperlipidemia No family hx of      CEREBROVASCULAR DISEASE No family hx of      Breast Cancer No family hx of      Colon Cancer No family hx of      Other Cancer No family hx of      Depression No family hx of      Anxiety Disorder No family hx of      MENTAL ILLNESS No family hx of      Substance Abuse No family hx of      Anesthesia Reaction No family hx of      Asthma No family hx of      OSTEOPOROSIS No family hx of      Genetic Disorder No family hx of      Obesity No family hx of        Prior to Admission Medications   Prior to Admission Medications   Prescriptions Last Dose Informant Patient Reported? Taking?   ATORVASTATIN CALCIUM PO 3/14/2018 at Unknown time Pharmacy Yes Yes   Sig: Take 20 mg by mouth daily    lisinopril-hydrochlorothiazide (PRINZIDE/ZESTORETIC) 10-12.5 MG per tablet  Pharmacy Yes Yes   Sig: Take 1 tablet by mouth daily      Facility-Administered Medications: None     Allergies   Allergies   Allergen Reactions     Codeine Sulfate Nausea     Hydromorphone Itching     Generalized body itching; required discontinuing the drug     Pollen Extract      Runny nose       Physical Exam   Vital Signs: Temp: 98.7  F (37.1  C) Temp src: Oral BP: (!) 165/91   Heart Rate: 66 Resp: 16 SpO2: 99 % O2 Device: None (Room air)    Weight: 178 lbs 2.11 oz      Physical Exam   Constitutional:  Pleasant female lying in ED bed. Family at bedside. Well nourished, well developed, resting comfortably   HEENT:   Head: Normocephalic and atraumatic.   Eyes: Conjunctivae are normal. Pupils are equal, round, and reactive to light.  Pharynx has no erythema or exudate, mucous membranes are moist  Neck:   No adenopathy, no bony tenderness  Cardiovascular: Regular rate and  rhythm without murmurs or gallops  Pulmonary/Chest: Clear to auscultation bilaterally, with no wheezes or retractions. No respiratory distress.  GI: Soft with good bowel sounds. Mildly TTP in epigastric and suprapubic region, non-distended, with no guarding, no rebound, no peritoneal signs.   Back:  No bony tenderness. Bilateral L>R CVA tenderness.  Musculoskeletal:  No edema or clubbing   Skin: Skin is warm and dry. No rash noted to exposed skin areas.   Neurological: Alert and oriented to person, place, and time. Nonfocal exam  Psychiatric:  Normal mood and affect.      Data   Data reviewed today: I reviewed all medications, new labs and imaging results over the last 24 hours. I personally reviewed recent imaging, labs, and progress notes.     Data     Recent Labs  Lab 03/15/18  1349   WBC 10.5   HGB 12.2   MCV 86   *      POTASSIUM 3.7   CHLORIDE 105   CO2 24   BUN 12   CR 0.71   ANIONGAP 10   FARSHAD 8.9   *   ALBUMIN 3.9   PROTTOTAL 8.0   BILITOTAL 0.4   ALKPHOS 115   ALT 24   AST 15   LIPASE 1058*

## 2018-03-15 NOTE — ED NOTES
[facility], [place]   ED Nurse to Floor Handoff     Sophia Andrew is a 57 year old female who speaks English and lives with a spouse,  in a home  They arrived in the ED by car from home    ED Chief Complaint: Flank Pain and Nausea    ED Dx;   Final diagnoses:   Acute pancreatitis, unspecified complication status, unspecified pancreatitis type         Needed?: No    Allergies:   Allergies   Allergen Reactions     Codeine Sulfate Nausea     Hydromorphone Itching     Generalized body itching; required discontinuing the drug     Pollen Extract      Runny nose   .  Past Medical Hx:   Past Medical History:   Diagnosis Date     Hypertension      Kidney stones 4/14/2011    nephrostomy tube      Baseline Mental status: WDL  Current Mental Status changes: at basesline    Infection: No  Sepsis suspected: No  Isolation type: No active isolations     Activity level - Baseline/Home:  Independent  Activity Level - Current:   Stand with Assist    Bariatric equipment needed?: No    In the ED these meds were given:   Medications   0.9% sodium chloride BOLUS (0 mLs Intravenous Stopped 3/15/18 1515)     Followed by   sodium chloride 0.9% infusion (not administered)   morphine (PF) injection 4 mg (4 mg Intravenous Given 3/15/18 1607)   sodium chloride 0.9 % bag 500mL for CT scan flush use (78 mLs Intravenous Given 3/15/18 1547)   lisinopril-hydrochlorothiazide (PRINZIDE/ZESTORETIC) 10-12.5 MG per tablet 1 tablet (not administered)   ketorolac (TORADOL) injection 30 mg (30 mg Intravenous Given 3/15/18 1400)   ondansetron (ZOFRAN) injection 4 mg (4 mg Intravenous Given 3/15/18 1400)   0.9% sodium chloride BOLUS (1,000 mLs Intravenous New Bag 3/15/18 1521)   diphenhydrAMINE (BENADRYL) capsule 50 mg (50 mg Oral Given 3/15/18 1559)   iopamidol (ISOVUE-370) solution 111 mL (111 mLs Intravenous Given 3/15/18 1547)       Drips running?  No    Home pump or pre-existing LDA's present? No    Labs results:    Labs Ordered and Resulted from Time of ED Arrival Up to the Time of Departure from the ED   CBC WITH PLATELETS DIFFERENTIAL - Abnormal; Notable for the following:        Result Value    Platelet Count 493 (*)     All other components within normal limits   COMPREHENSIVE METABOLIC PANEL - Abnormal; Notable for the following:     Glucose 101 (*)     All other components within normal limits   LIPASE - Abnormal; Notable for the following:     Lipase 1058 (*)     All other components within normal limits   ROUTINE UA WITH MICROSCOPIC - Abnormal; Notable for the following:     Protein Albumin Urine 30 (*)     WBC Urine 6 (*)     Bacteria Urine Moderate (*)     Squamous Epithelial /HPF Urine 21 (*)     Mucous Urine Present (*)     All other components within normal limits   AMYLASE - Abnormal; Notable for the following:     Amylase 171 (*)     All other components within normal limits   LACTIC ACID WHOLE BLOOD   URINE CULTURE AEROBIC BACTERIAL       Imaging Studies:   Recent Results (from the past 24 hour(s))   CT Abdomen Pelvis w Contrast    Narrative    EXAMINATION: CT ABDOMEN PELVIS W CONTRAST, 3/15/2018 3:56 PM    TECHNIQUE:  Helical CT images from the lung bases through the  symphysis pubis were obtained with contrast.  Coronal reformatted  images were generated at a workstation for further assessment.    CONTRAST:  111 cc Isovue 370 IV.    COMPARISON: 3/15/2017    HISTORY: abdominal pain/flank pain, fever, elevated lipase, eval for  pancreatitic abscess, h/o ureterolithiasis, ?recurrent stone;     FINDINGS:    Abdomen and pelvis:   Liver: No suspicious liver lesions. Portal veins appear patent.  Gallbladder: No gallstones. No evidence of acute cholecystitis.  Spleen: Normal size.  Pancreas: No suspicious pancreatic lesions. The pancreatic duct is not  dilated.  Adrenal glands: No adrenal nodules.  Kidneys: There is a 6 mm nonobstructing calyceal stone in the left  kidney. Multiple subcentimeter hypodensities  bilaterally, too small to  characterize, likely simple cysts. There are bilateral wedge shaped  areas of hypoperfusion, some of which demonstrate overlying cortical  loss compatible with scarring. No hydronephrosis. Trace left greater  than right urothelial thickening/enhancement and mild haziness along  the renal pelves.  Bladder / Pelvic organs: The bladder is mostly decompressed with wall  thickening slightly greater than expected for nondistention. Left  ovarian corpus luteum. No suspicious adnexal mass.  Bowel: Diverticulosis without diverticulitis. No bowel wall  thickening. The appendix is unremarkable.  Lymph nodes: No retroperitoneal, mesenteric, or pelvic  lymphadenopathy.  Fluid: No free fluid within the abdomen.  Vessels: No infrarenal aortic aneurysm.     Lung bases: No consolidation or pleural effusion.    Bones and soft tissues: No suspicious osseous lesions.      Impression    IMPRESSION:   1.  Bilateral left greater than right wedge-shaped renal parenchymal  hypodensities, some of which are compatible with scarring and some of  which are suspicious for pyelonephritis especially given the trace  urothelial thickening/enhancement and likely mild wall thickening of  the incompletely distended bladder.  2.  Nonobstructing 6 mm stone in the left kidney.  3.  Diverticulosis without diverticulitis.    I have personally reviewed the examination and initial interpretation  and I agree with the findings.    TONY COLON MD       Recent vital signs:   BP (!) 175/117  Temp 99.9  F (37.7  C) (Oral)  Resp 18  SpO2 100%    Cardiac Rhythm: Normal Sinus  Pt needs tele? No  Skin/wound Issues: None    Code Status: Full Code    Pain control: good    Nausea control: good    Abnormal labs/tests/findings requiring intervention: Elevated lipase     Family present during ED course? Yes   Family Comments/Social Situation comments: N/A    Tasks needing completion: None    Agata Redmond RN  [facility]-- ED  [phone] [place]  ED  [phone] [facility]

## 2018-03-15 NOTE — ED NOTES
BP elevated. Pt recently started on BP medication. MD ordered home dose, pt reports not taking medication today. Pt denies feeling symptomatic

## 2018-03-15 NOTE — ED PROVIDER NOTES
History     Chief Complaint   Patient presents with     Flank Pain     Nausea     HPI  Sophia Andrew is a 57 year old female with a history of hypertension and recurrent kidney stones previously treated with nephrolithotomy and stents numerous times (last stent in ) who presents to the emergency department for the evaluation of bilateral flank pain and nausea/vomiting.  Patient states that she was seen in clinic about 1 month ago and diagnosed with a UTI for which she took a course of antibiotics with complete resolution of her symptoms.  She states that her symptoms then restarted approximately 1 week ago with initially left-sided flank pain that then progressed bilaterally with associated nausea and vomiting.  With this, she endorses suprapubic abdominal pain, chills, and urinary frequency, but she otherwise denies any explicit dysuria, recorded fevers, chest pain, or shortness of breath. Additionally, she reports that she has had intermittent burning epigastric pain and diffuse headaches for the past few weeks, but these are unchanged in the setting of her more recent symptoms.  She states that her pain she is experiencing now is reminiscent of her previous kidney stones, and she is worried that she may have another one. No prior abdominal surgeries, but the patient estimates that she has had approximately 13 procedures related to kidney stones since .         PAST MEDICAL HISTORY:   Past Medical History:   Diagnosis Date     Hypertension      Kidney stones 2011    nephrostomy tube       PAST SURGICAL HISTORY:   Past Surgical History:   Procedure Laterality Date      SECTION  X2      SECTION      x2     COMBINED CYSTOSCOPY, INSERT STENT URETER(S)  1/15/2014    Procedure: COMBINED CYSTOSCOPY, INSERT STENT URETER(S);  Cystoscopy, Right ureteral stent placement;  Surgeon: Patience Coy MD;  [facility]     COMBINED CYSTOSCOPY, INSERT STENT URETER(S) Left 2015     Procedure: COMBINED CYSTOSCOPY, INSERT STENT URETER(S);  Surgeon: Marisol Grubbs MD;  [facility]     COMBINED CYSTOSCOPY, RETROGRADES, URETEROSCOPY, LASER HOLMIUM LITHOTRIPSY URETER(S), INSERT STENT Left 6/4/2015    Procedure: COMBINED CYSTOSCOPY, RETROGRADES, URETEROSCOPY, LASER HOLMIUM LITHOTRIPSY URETER(S), INSERT STENT;  Surgeon: Mayte Purcell MD;  [facility]     COMBINED CYSTOSCOPY, RETROGRADES, URETEROSCOPY, LASER HOLMIUM LITHOTRIPSY URETER(S), INSERT STENT Left 9/9/2015    Procedure: COMBINED CYSTOSCOPY, RETROGRADES, URETEROSCOPY, LASER HOLMIUM LITHOTRIPSY URETER(S), INSERT STENT;  Surgeon: Mayte Purcell MD;  [facility]     COMBINED CYSTOSCOPY, RETROGRADES, URETEROSCOPY, LASER HOLMIUM LITHOTRIPSY URETER(S), INSERT STENT N/A 2/9/2017    Procedure: COMBINED CYSTOSCOPY, RETROGRADES, URETEROSCOPY, LASER HOLMIUM LITHOTRIPSY URETER(S), INSERT STENT;  Surgeon: Daniel Galeano MD;  [facility]     CYSTOSCOPY, RETROGRADES, INSERT STENT URETER(S), COMBINED Left 8/9/2016    Procedure: COMBINED CYSTOSCOPY, RETROGRADES, INSERT STENT URETER(S);  Surgeon: Daniel Galeano MD;  [facility]     CYSTOSCOPY, URETEROSCOPY, COMBINED  4/14/2011    Procedure:COMBINED CYSTOSCOPY, URETEROSCOPY; Holmium Laser Lithotripsy ; Surgeon:MAYTE PRUCELL; [facility]     EXTRACORPOREAL SHOCK WAVE LITHOTRIPSY (ESWL)      right kidney     GYN SURGERY       HYSTERECTOMY       LASER HOLMIUM LITHOTRIPSY URETER(S), INSERT STENT, COMBINED  5/5/2011    Procedure:COMBINED CYSTOSCOPY, URETEROSCOPY, LASER HOLMIUM LITHOTRIPSY URETER(S), INSERT STENT; Surgeon:MAYTE PURCELL; [facility]     LASER HOLMIUM LITHOTRIPSY URETER(S), INSERT STENT, COMBINED  1/29/2014    Procedure: COMBINED CYSTOSCOPY, URETEROSCOPY, LASER HOLMIUM LITHOTRIPSY URETER(S), INSERT STENT;  Cystoscopy, Right Ureteroscopy With Holmium Laser, Right Stent Placement  ;  Surgeon: Mayte Purcell MD;  [facility]     LASER HOLMIUM LITHOTRIPSY URETER(S), INSERT STENT, COMBINED Left 6/17/2015    Procedure: COMBINED CYSTOSCOPY, URETEROSCOPY, LASER HOLMIUM LITHOTRIPSY URETER(S), INSERT STENT;  Surgeon: Mayte Purcell MD;  [facility]     LASER HOLMIUM LITHOTRIPSY URETER(S), INSERT STENT, COMBINED Bilateral 8/6/2015    Procedure: COMBINED CYSTOSCOPY, URETEROSCOPY, LASER HOLMIUM LITHOTRIPSY URETER(S), INSERT STENT;  Surgeon: Mayte Purcell MD;  [facility]     LASER HOLMIUM LITHOTRIPSY URETER(S), INSERT STENT, COMBINED Left 8/19/2015    Procedure: COMBINED CYSTOSCOPY, URETEROSCOPY, LASER HOLMIUM LITHOTRIPSY URETER(S), INSERT STENT;  Surgeon: Mayte Purcell MD;  [facility]     LASER HOLMIUM LITHOTRIPSY URETER(S), INSERT STENT, COMBINED Left 8/18/2016    Procedure: COMBINED CYSTOSCOPY, URETEROSCOPY, LASER HOLMIUM LITHOTRIPSY URETER(S), INSERT STENT;  Surgeon: Daniel Galeano MD;  [facility]     LASER HOLMIUM NEPHROLITHOTOMY VIA PERCUTANEOUS NEPHROSTOMY  10/20/2011    Procedure:LASER HOLMIUM NEPHROLITHOTOMY VIA PERCUTANEOUS NEPHROSTOMY; Left Holmium Laser of a Caliceal Diverticulum, Stent Placement with Balloon Dilatation, Ureteroscopy, Cystoscopy.; Surgeon:MAYTE PURCELL; [facility]     LASER HOLMIUM NEPHROLITHOTOMY VIA PERCUTANEOUS NEPHROSTOMY  5/5/2011    Procedure:LASER HOLMIUM NEPHROLITHOTOMY VIA PERCUTANEOUS NEPHROSTOMY; Ureteroscopy, retrograde pylogram, left stent placement.  laser not used, laser setup and not used.; Surgeon:MAYTE PURCELL; [facility]     PERCUTANEOUS NEPHROLITHOTOMY  4/14/2011    Procedure:PERCUTANEOUS NEPHROLITHOTOMY; Surgeon:MAYTE PURCELL; [facility]       FAMILY HISTORY:   Family History   Problem Relation Age of Onset     DIABETES Sister      DIABETES Mother      DIABETES Other      DIABETES Maternal Grandmother      DIABETES Maternal Grandfather      Prostate Cancer Father      Thyroid Disease Mother      Coronary Artery  Disease No family hx of      Hypertension No family hx of      Hyperlipidemia No family hx of      CEREBROVASCULAR DISEASE No family hx of      Breast Cancer No family hx of      Colon Cancer No family hx of      Other Cancer No family hx of      Depression No family hx of      Anxiety Disorder No family hx of      MENTAL ILLNESS No family hx of      Substance Abuse No family hx of      Anesthesia Reaction No family hx of      Asthma No family hx of      OSTEOPOROSIS No family hx of      Genetic Disorder No family hx of      Obesity No family hx of        SOCIAL HISTORY:   Social History   Substance Use Topics     Smoking status: Former Smoker     Packs/day: 0.50     Years: 40.00     Types: Cigarettes     Quit date: 7/24/2016     Smokeless tobacco: Never Used     Alcohol use No       Patient's Medications   New Prescriptions    No medications on file   Previous Medications    ATORVASTATIN CALCIUM PO        GABAPENTIN (NEURONTIN) 300 MG CAPSULE    take 300mg in the morning and afternoon and 600mg at bedtime. If side effects, then reduce to last tolerable dosage.    HYDROCODONE-ACETAMINOPHEN (NORCO) 5-325 MG PER TABLET    Take 1 tablet by mouth every 4 hours as needed for pain maximum 18 tablet(s) per day    IBUPROFEN (ADVIL,MOTRIN) 600 MG TABLET    Take 1 tablet (600 mg) by mouth every 6 hours as needed for moderate pain    LISINOPRIL PO        METHOCARBAMOL (ROBAXIN) 500 MG TABLET    Take 1-2 tablets (500-1,000 mg) by mouth 3 times daily as needed for muscle spasms Caution sedation, start with lower dose first    TAMSULOSIN (FLOMAX) 0.4 MG CAPSULE    Take 1 capsule (0.4 mg) by mouth daily For stent related discomfort   Modified Medications    No medications on file   Discontinued Medications    No medications on file          Allergies   Allergen Reactions     Codeine Sulfate Nausea     Hydromorphone Itching     Generalized body itching; required discontinuing the drug     Pollen Extract      Runny nose               I have reviewed the Medications, Allergies, Past Medical and Surgical History, and Social History in the Epic system.    Review of Systems   Constitutional: Positive for chills. Negative for fever (none recorded).   HENT: Negative for congestion and sore throat.    Respiratory: Negative for cough and shortness of breath.    Cardiovascular: Negative for chest pain.   Gastrointestinal: Positive for abdominal pain, nausea and vomiting.   Genitourinary: Positive for flank pain (bilateral) and frequency. Negative for dysuria.   Neurological: Positive for headaches.   All other systems reviewed and are negative.      Physical Exam   BP: 127/85  Heart Rate: 88  Temp: 99.9  F (37.7  C)  Resp: 18  SpO2: 99 %      Physical Exam   General: patient is alert and oriented and in no acute distress   Head: atraumatic and normocephalic   EENT: moist mucus membranes without tonsillar erythema or exudates, pupils round and reactive, sclera anicteric  Neck: supple   Cardiovascular: regular rate and rhythm, extremities warm and well perfused, no lower extremity edema  Pulmonary: lungs clear to auscultation bilaterally   Abdomen: soft, tenderness to palpation in the lower abdomen without any guarding, epigastric tenderness as well without guarding, no right upper quadrant tenderness, bilateral CVA tenderness  Musculoskeletal: normal range of motion   Neurological: alert and oriented, moving all extremities symmetrically, gait normal   Skin: warm, dry       ED Course     ED Course     Procedures             Critical Care time:  none             Labs Ordered and Resulted from Time of ED Arrival Up to the Time of Departure from the ED - No data to display         Assessments & Plan (with Medical Decision Making)   57-year-old female with a history of recurrent ureteral stones as well as a history of sepsis secondary to UTI and acute renal failure who presents with bilateral flank pain, dysuria, and nausea.  She is noted to have a temp  of 99.9 in the ED.  She is otherwise hemodynamically stable and in no respiratory distress.  She did have bilateral CVA tenderness on exam.  Differential diagnosis includes but is not limited to UTI, pyelonephritis, ureterolithiasis, infected ureterolithiasis, PRICILLA, electrolyte derangement.  In addition she does report some intermittent epigastric pain over the last few weeks.  Consideration for biliary pathology as well as pancreatitis, gastritis, peptic ulcer disease, or hepatitis is also on the differential.  Labs are obtained including CBC, CMP, lipase, UA.  She has a white count of 10.5.  Electrolytes are within normal limits.  LFTs are also within normal limits.  Her lipase is significantly elevated at 1058.  Lactate is within normal limits.  UA shows 6 white blood cells with moderate bacteria, 2 RBCs.  She has negative nitrate and leukocyte Estrace and does have a large amount of squamous epithelial cells and appears to be contaminated.  This was sent for culture.  Bedside gallbladder ultrasound is suggestive of biliary sludge without obvious stone or wall thickening.  She denies any history of cholelithiasis and denies alcohol use.  She was taken for an abdominal CT which is pending. An IV was established and she was given 2 L of IV fluids, Zofran, Toradol. Will plan to admit to medicine for further management of pancreatitis..    I have reviewed the nursing notes.    I have reviewed the findings, diagnosis, plan and need for follow up with the patient.  This part of the document was transcribed by Mayra Santiago Medical Scribe.   New Prescriptions    No medications on file       Final diagnoses:   Acute pancreatitis, unspecified complication status, unspecified pancreatitis type   I, Joseph Joshi, am serving as a trained medical scribe to document services personally performed by Noemi Miller MD, based on the provider's statements to me.      I, Noemi Miller MD, was physically present and have  reviewed and verified the accuracy of this note documented by Joseph Joshi.       3/15/2018   [facility], [place], EMERGENCY DEPARTMENT     Noemi Miller MD  03/15/18 2718

## 2018-03-15 NOTE — IP AVS SNAPSHOT
MRN:[id number]                      After Visit Summary   3/15/2018    Sophia Andrew    MRN: [id number]           Thank you!     Thank you for choosing [facility] for your care. Our goal is always to provide you with excellent care. Hearing back from our patients is one way we can continue to improve our services. Please take a few minutes to complete the written survey that you may receive in the mail after you visit with us. Thank you!        Patient Information     Date Of Birth          1960        Designated Caregiver       Most Recent Value    Caregiver    Will someone help with your care after discharge? no      About your hospital stay     You were admitted on:  March 15, 2018 You last received care in the:  [address] [facility] [place]    You were discharged on:  March 17, 2018        Reason for your hospital stay       You were hospitalized for bilateral flank pain and found to have acute pancreatitis. You were treated with IV fluids and your symptoms resolved.                  Who to Call     For medical emergencies, please call 911.  For non-urgent questions about your medical care, please call your primary care provider or clinic, [phone]          Attending Provider     Provider Specialty    Noemi Miller MD Emergency Medicine    Sudarshan, Bowen Mcgee MD Internal Medicine    Jordan Mccurdy MD Internal Medicine       Primary Care Provider Office Phone # Fax #    Mirlande FUCHS OkMichael [phone]       When to contact your care team       Call your primary doctor if you have any of the following: temperature greater than 102 or less than 95, nausea, vomiting, chest pain, shortness of breath, abdominal pain, or difficulty breathing.                  After Care Instructions     Activity       Your activity upon discharge: activity as tolerated            Diet       Follow this diet upon discharge: Orders Placed This Encounter      Advance  Diet as Tolerated: Regular Diet Adult                  Follow-up Appointments     Adult [facility] [[email or web address] Follow-up and recommended labs and tests       Follow up with primary care provider, Mirlande Fuentes, within 7 days to evaluate medication change and for hospital follow- up.    - F/u with Urology as scheduled 4/17/18  - KUB, Renal US, NM renogram orders placed to be obtained prior to appointment     Appointments on [place] and/or [facility] (with [facility] [[email or web address]] or [facility] provider or service). Call [phone] if you haven't heard regarding these appointments within 7 days of discharge.                  Your next 10 appointments already scheduled     Apr 17, 2018  2:15 PM CDT   XR KUB with [email or web address]   [facility] Xray ([facility])    [address] [place] [address] [phone]           Please bring a list of your current medicines to your exam. (Include vitamins, minerals and over-thecounter medicines.) Leave your valuables at home.  Tell your doctor if there is a chance you may be pregnant.  You do not need to do anything special for this exam.            Apr 17, 2018  2:30 PM CDT   US RENAL COMPLETE with [email or web address]   [facility] US ([facility])    [address] [place] [address] [phone]           Please bring a list of your medicines (including vitamins, minerals and over-the-counter drugs). Also, tell your doctor about any allergies you may have. Wear comfortable clothes and leave your valuables at home.  You do not need to do anything special to prepare for your exam.  Please call the Imaging Department at your exam site with any questions.            Apr 17, 2018  3:20 PM CDT   (Arrive by 3:05 PM)   Return Visit with Daniel Galeano MD   [facility]rology and [facility] ([facility])    [address] [place] [address]  "[place] [id number] [phone]              Future tests that were ordered for you     NM Lasix renogram                 Pending Results     Date and Time Order Name Status Description    3/15/2018 1446 POC US ABDOMEN LIMITED In process             Statement of Approval     Ordered          03/17/18 1345  I have reviewed and agree with all the recommendations and orders detailed in this document.  EFFECTIVE NOW     Approved and electronically signed by:  Sharron Mandujano PA-C             Admission Information     Date & Time Provider Department Dept. Phone    3/15/2018 Jordan Mccurdy MD [address] [facility] [place] [phone]      Your Vitals Were     Blood Pressure Pulse Temperature Respirations Height Weight    135/75 (BP Location: Right arm) 88 98.4  F (36.9  C) (Oral) 16 1.6 m (5' 3\") 80.1 kg (176 lb 8 oz)    Pulse Oximetry BMI (Body Mass Index)                99% 31.27 kg/m2          [employer] Information     [employer] gives you secure access to your electronic health record. If you see a primary care provider, you can also send messages to your care team and make appointments. If you have questions, please call your primary care clinic.  If you do not have a primary care provider, please call [phone] and they will assist you.        Care EveryWhere ID     This is your Care EveryWhere ID. This could be used by other organizations to access your [facility] medical records  [id number]        Equal Access to Services     ETTA CHONG AH: Hadii elfego almonte Soskylar, waaxda luqadaha, qaybta kaalmada juliana felipe . So [facility] [phone].    ATENCIÓN: Si habla español, tiene a schreiber disposición servicios gratuitos de asistencia lingüística. Llame al [phone].    We comply with applicable federal civil rights laws and [place] laws. We do not discriminate on the basis of race, color, national origin, age, disability, sex, sexual orientation, or gender identity.       "         Review of your medicines      START taking        Dose / Directions    amLODIPine 5 MG tablet   Commonly known as:  NORVASC   Used for:  Benign essential hypertension        Dose:  5 mg   Start taking on:  3/18/2018   Take 1 tablet (5 mg) by mouth daily   Quantity:  30 tablet   Refills:  0       lisinopril 10 MG tablet   Commonly known as:  PRINIVIL/ZESTRIL   Used for:  Benign essential hypertension        Dose:  10 mg   Take 1 tablet (10 mg) by mouth daily   Quantity:  30 tablet   Refills:  0         CONTINUE these medicines which have NOT CHANGED        Dose / Directions    ATORVASTATIN CALCIUM PO        Dose:  20 mg   Take 20 mg by mouth daily   Refills:  0         STOP taking     lisinopril-hydrochlorothiazide 10-12.5 MG per tablet   Commonly known as:  PRINZIDE/ZESTORETIC                Where to get your medicines      These medications were sent to [facility] [address] [address]     Phone:  [phone]     amLODIPine 5 MG tablet    lisinopril 10 MG tablet                Protect others around you: Learn how to safely use, store and throw away your medicines at www.disposemymeds.org.             Medication List: This is a list of all your medications and when to take them. Check marks below indicate your daily home schedule. Keep this list as a reference.      Medications           Morning Afternoon Evening Bedtime As Needed    amLODIPine 5 MG tablet   Commonly known as:  NORVASC   Take 1 tablet (5 mg) by mouth daily   Start taking on:  3/18/2018   Last time this was given:  5 mg on 3/17/2018  8:36 AM                                ATORVASTATIN CALCIUM PO   Take 20 mg by mouth daily                                lisinopril 10 MG tablet   Commonly known as:  PRINIVIL/ZESTRIL   Take 1 tablet (10 mg) by mouth daily

## 2018-03-15 NOTE — SUMMARY OF CARE
Belongings include: cell phone with , black purse, black coat, black pants, black boots, 2 pink shirts, and 2 rings. Patient declined sending any belongings to security.

## 2018-03-15 NOTE — TELEPHONE ENCOUNTER
Patient coming in for kidney stone check. Patient was supposed to have a renal US before following up last year but didn't.  Messaged  To see if he wants this.

## 2018-03-15 NOTE — IP AVS SNAPSHOT
[address] [address]    Phone:  [phone]                                       After Visit Summary   3/15/2018    Sophia Andrew    MRN: [id number]           After Visit Summary Signature Page     I have received my discharge instructions, and my questions have been answered. I have discussed any challenges I see with this plan with the nurse or doctor.    ..........................................................................................................................................  Patient/Patient Representative Signature      ..........................................................................................................................................  Patient Representative Print Name and Relationship to Patient    ..................................................               ................................................  Date                                            Time    ..........................................................................................................................................  Reviewed by Signature/Title    ...................................................              ..............................................  Date                                                            Time

## 2018-03-15 NOTE — PHARMACY-ADMISSION MEDICATION HISTORY
"Admission medication history interview status for the 3/15/2018 admission is complete. See Epic admission navigator for allergy information, pharmacy, prior to admission medications and immunization status.     Medication history interview sources:  Self, [facility] [place] [phone], [facility] ([phone])    Changes made to PTA medication list (reason)  Added: Lisinopril-hctz (per pt and [facility])  Deleted: Gabapentin (per pt), hydrocodone-acetaminophen (course ended), ibuprofen (per pt), lisinopril (per pt and [facility])  Changed: atorvastatin (per pt and [facility])    Additional medication history information (including reliability of information, actions taken by pharmacist):    - Patient had difficulties recalling the medications she takes. When asked each medication name, she cannot remember. Her old pharmacy was [facility] there reported no recent fills since 7/2017 and gabapentin, ibuprofen, methocarbamol, and tamsulosin were filled with them 6 mos - 1 year ago. Patient does not recall currently taking these. Her new pharmacy, [facility], confirmed recent fills (3/2018) of atorvastatin, lisinopril-hctz, and hydrocodone-acetaminophen. Patient reports no longer taking hydrocodone-acetaminophen and recalls currently taking atorvastatin and lisinopril-hctz when I described as \"for cholesterol and for blood pressure\". She said all last doses were 3/14/2018  - Last flu shot was fall last year    Prescriptions Prior to Admission   Medication Sig Dispense Refill Last Dose     lisinopril-hydrochlorothiazide (PRINZIDE/ZESTORETIC) 10-12.5 MG per tablet Take 1 tablet by mouth daily   3/14/2018 at Unknown time     ATORVASTATIN CALCIUM PO Take 20 mg by mouth daily    3/14/2018 at Unknown time       Medication history completed by: Grisel Flores (Lily), Student Pharmacist      "

## 2018-03-15 NOTE — ED NOTES
Pt comes to ER for bilateral flank pain, back pain, abdominal pain, and nausea. Pt has significant kidney stone history requiring lithotripsy, stents, and surgery.  Pt denies urinary sx at this time. Pt is concerned for a new stone.

## 2018-03-16 DIAGNOSIS — N20.0 CALCULUS OF KIDNEY: Primary | ICD-10-CM

## 2018-03-16 LAB
ANION GAP SERPL CALCULATED.3IONS-SCNC: 7 MMOL/L (ref 3–14)
BACTERIA SPEC CULT: NORMAL
BUN SERPL-MCNC: 13 MG/DL (ref 7–30)
C TRACH DNA SPEC QL NAA+PROBE: NEGATIVE
CALCIUM SERPL-MCNC: 7.6 MG/DL (ref 8.5–10.1)
CHLORIDE SERPL-SCNC: 110 MMOL/L (ref 94–109)
CO2 SERPL-SCNC: 24 MMOL/L (ref 20–32)
CREAT SERPL-MCNC: 0.76 MG/DL (ref 0.52–1.04)
ERYTHROCYTE [DISTWIDTH] IN BLOOD BY AUTOMATED COUNT: 14.2 % (ref 10–15)
GFR SERPL CREATININE-BSD FRML MDRD: 79 ML/MIN/1.7M2
GLUCOSE SERPL-MCNC: 77 MG/DL (ref 70–99)
HCT VFR BLD AUTO: 30.9 % (ref 35–47)
HGB BLD-MCNC: 9.6 G/DL (ref 11.7–15.7)
LIPASE SERPL-CCNC: 316 U/L (ref 73–393)
Lab: NORMAL
MCH RBC QN AUTO: 27.9 PG (ref 26.5–33)
MCHC RBC AUTO-ENTMCNC: 31.1 G/DL (ref 31.5–36.5)
MCV RBC AUTO: 90 FL (ref 78–100)
N GONORRHOEA DNA SPEC QL NAA+PROBE: NEGATIVE
PLATELET # BLD AUTO: 348 10E9/L (ref 150–450)
POTASSIUM SERPL-SCNC: 3.8 MMOL/L (ref 3.4–5.3)
RBC # BLD AUTO: 3.44 10E12/L (ref 3.8–5.2)
SODIUM SERPL-SCNC: 141 MMOL/L (ref 133–144)
SPECIMEN SOURCE: NORMAL
WBC # BLD AUTO: 8.9 10E9/L (ref 4–11)

## 2018-03-16 PROCEDURE — 12000008 ZZH R&B INTERMEDIATE UMMC

## 2018-03-16 PROCEDURE — 85027 COMPLETE CBC AUTOMATED: CPT | Performed by: PHYSICIAN ASSISTANT

## 2018-03-16 PROCEDURE — 25000128 H RX IP 250 OP 636: Performed by: PHYSICIAN ASSISTANT

## 2018-03-16 PROCEDURE — 27210995 ZZH RX 272: Performed by: PHYSICIAN ASSISTANT

## 2018-03-16 PROCEDURE — 25000132 ZZH RX MED GY IP 250 OP 250 PS 637: Performed by: PHYSICIAN ASSISTANT

## 2018-03-16 PROCEDURE — 83690 ASSAY OF LIPASE: CPT | Performed by: PHYSICIAN ASSISTANT

## 2018-03-16 PROCEDURE — 36415 COLL VENOUS BLD VENIPUNCTURE: CPT | Performed by: PHYSICIAN ASSISTANT

## 2018-03-16 PROCEDURE — 99233 SBSQ HOSP IP/OBS HIGH 50: CPT | Performed by: INTERNAL MEDICINE

## 2018-03-16 PROCEDURE — 80048 BASIC METABOLIC PNL TOTAL CA: CPT | Performed by: PHYSICIAN ASSISTANT

## 2018-03-16 RX ORDER — IBUPROFEN 200 MG
600 TABLET ORAL EVERY 6 HOURS PRN
Status: DISCONTINUED | OUTPATIENT
Start: 2018-03-16 | End: 2018-03-17 | Stop reason: HOSPADM

## 2018-03-16 RX ORDER — ACETAMINOPHEN 325 MG/1
650 TABLET ORAL EVERY 6 HOURS PRN
Status: DISCONTINUED | OUTPATIENT
Start: 2018-03-16 | End: 2018-03-17 | Stop reason: HOSPADM

## 2018-03-16 RX ORDER — AMLODIPINE BESYLATE 5 MG/1
5 TABLET ORAL DAILY
Status: DISCONTINUED | OUTPATIENT
Start: 2018-03-16 | End: 2018-03-17 | Stop reason: HOSPADM

## 2018-03-16 RX ADMIN — DOCUSATE SODIUM 100 MG: 100 CAPSULE, LIQUID FILLED ORAL at 19:45

## 2018-03-16 RX ADMIN — SENNOSIDES AND DOCUSATE SODIUM 2 TABLET: 8.6; 5 TABLET ORAL at 21:22

## 2018-03-16 RX ADMIN — OXYCODONE HYDROCHLORIDE 5 MG: 5 TABLET ORAL at 18:52

## 2018-03-16 RX ADMIN — DOCUSATE SODIUM 100 MG: 100 CAPSULE, LIQUID FILLED ORAL at 08:22

## 2018-03-16 RX ADMIN — PANTOPRAZOLE SODIUM 40 MG: 40 TABLET, DELAYED RELEASE ORAL at 08:23

## 2018-03-16 RX ADMIN — OXYCODONE HYDROCHLORIDE 5 MG: 5 TABLET ORAL at 00:46

## 2018-03-16 RX ADMIN — AMLODIPINE BESYLATE 5 MG: 5 TABLET ORAL at 13:24

## 2018-03-16 RX ADMIN — SODIUM CHLORIDE, POTASSIUM CHLORIDE, SODIUM LACTATE AND CALCIUM CHLORIDE: 600; 310; 30; 20 INJECTION, SOLUTION INTRAVENOUS at 02:57

## 2018-03-16 RX ADMIN — NICOTINE 1 PATCH: 14 PATCH, EXTENDED RELEASE TRANSDERMAL at 08:23

## 2018-03-16 RX ADMIN — SODIUM CHLORIDE, POTASSIUM CHLORIDE, SODIUM LACTATE AND CALCIUM CHLORIDE: 600; 310; 30; 20 INJECTION, SOLUTION INTRAVENOUS at 14:51

## 2018-03-16 RX ADMIN — LIDOCAINE 1 PATCH: 560 PATCH PERCUTANEOUS; TOPICAL; TRANSDERMAL at 19:45

## 2018-03-16 RX ADMIN — OXYCODONE HYDROCHLORIDE 5 MG: 5 TABLET ORAL at 07:49

## 2018-03-16 RX ADMIN — PHENAZOPYRIDINE HYDROCHLORIDE 100 MG: 100 TABLET, FILM COATED ORAL at 08:23

## 2018-03-16 RX ADMIN — PHENAZOPYRIDINE HYDROCHLORIDE 100 MG: 100 TABLET, FILM COATED ORAL at 18:52

## 2018-03-16 RX ADMIN — CEFTRIAXONE SODIUM 1 G: 10 INJECTION, POWDER, FOR SOLUTION INTRAVENOUS at 21:22

## 2018-03-16 RX ADMIN — PHENAZOPYRIDINE HYDROCHLORIDE 100 MG: 100 TABLET, FILM COATED ORAL at 12:18

## 2018-03-16 ASSESSMENT — PAIN DESCRIPTION - DESCRIPTORS: DESCRIPTORS: ACHING;SORE

## 2018-03-16 NOTE — PLAN OF CARE
Problem: Patient Care Overview  Goal: Plan of Care/Patient Progress Review  Outcome: No Change  A:  Patient continues to have left flank pain.  Oxycodone given with relief.  Lidocaine patch on.  Patient states feels fluids are too much per IV this am.  States feels face puffy and hands puffy.  Reported this at change of shift and oncoming RN aware.  Plan to update MD.  Encouraged to measure urine output to keep track of fluids better.  Hat marked in bathroom for her.  Patient states understanding.  Up independently in room. Gait steady.  R:  Continue to monitor and treat per plan of care.

## 2018-03-16 NOTE — PLAN OF CARE
Problem: Patient Care Overview  Goal: Plan of Care/Patient Progress Review  Patient admitted from ED with left flank pain and nausea.  Lipase elevated. Oxycodone and lidocaine patch for pain management with good relief.  Patient tolerating clear liquid diet.  Up to bathroom with stand by assist of one.  UA sent.  LR infusing at 200cc/hr.  Plan to monitor pain and medicate as needed.

## 2018-03-16 NOTE — CONSULTS
"Urology Consult History and Physical    Name: Sophia Andrew    MRN: [id number]   YOB: 1960       We were asked to see Sophia Andrew at the request of Radha Price PA-C for evaluation and treatment of the following chief complaint.          Chief Complaint:   Bilateral flank pain with CT changes    History is obtained from patient and review of records          History of Present Illness:   Sophia Andrew is a 57 year old female well known to urology (Dr. Galeano) for history of kidney stones, hydrocalynx and stenotic infundibulum requiring dilations in the past who was admitted yesterday with nausea, emesis, abdominal pain, low grade fevers and chronic bilateral flank pain.  She has been diagnosed with acute pancreatitis, which she has had in the past, and today feels much, much better on IVF, NPO and ceftriaxone.  There were also concerns for pyelonephritis and from a urologic perspective her WBC was 13.4 at admission, and is now 9.4. She has two UAs from yesterday, both collected prior to starting antibiotics.  The first shows 6 WBC, 2RBC, 30 protein and 21 squam cells.  The second is a bit  with 1 WBC, 2 RBC, 10 protein and only 9 squam cells. A urine culture is pending.  Urology has been consulted today to review CT results which demonstrates decompressed bladder with wall thickening as well as  bilateral wedge shaped areas of hypoperfusion, some of which demonstrate overlying cortical loss compatible with scarring. No hydronephrosis. Trace left greater than right urothelial thickening/ enhancement and mild haziness along the renal pelves \"suspicious for pyelonephritis\".    The patient tells me that she has had flank pain for many months now.  It is almost always present on the left side, dull, sometimes worse and sometimes better, non-worsening.  On the right side pain isn't constant - she just gets infrequent twinges of discomfort. Her PCP did treat her flank pain with an " "antibiotic for a presumed UTI a few weeks ago.  Although she took all the doses, there was no change in her flank pain.     Today she has no bladder pain, hematuria or dysuria but endorses a pulling sensation in the right groin that seems more prominent with voiding.   She has been chronically constipated despite taking \"womens stool softeners\" at home.  Yesterday she had a BM with assistance from laxative and she is hoping for another today.  Gynecologically she has history of C section x 2 as well as a hysterectomy.           Past Medical History:     Past Medical History:   Diagnosis Date     Chronic left flank pain      Hypertension      Kidney stones 2011    nephrostomy tube     Tobacco abuse disorder             Past Surgical History:     Past Surgical History:   Procedure Laterality Date      SECTION  X2      SECTION      x2     COMBINED CYSTOSCOPY, INSERT STENT URETER(S)  1/15/2014    Procedure: COMBINED CYSTOSCOPY, INSERT STENT URETER(S);  Cystoscopy, Right ureteral stent placement;  Surgeon: Patience Coy MD;  [facility]     COMBINED CYSTOSCOPY, INSERT STENT URETER(S) Left 2015    Procedure: COMBINED CYSTOSCOPY, INSERT STENT URETER(S);  Surgeon: Marisol Grubbs MD;  [facility]     COMBINED CYSTOSCOPY, RETROGRADES, URETEROSCOPY, LASER HOLMIUM LITHOTRIPSY URETER(S), INSERT STENT Left 2015    Procedure: COMBINED CYSTOSCOPY, RETROGRADES, URETEROSCOPY, LASER HOLMIUM LITHOTRIPSY URETER(S), INSERT STENT;  Surgeon: Yaakov Purcell MD;  [facility]     COMBINED CYSTOSCOPY, RETROGRADES, URETEROSCOPY, LASER HOLMIUM LITHOTRIPSY URETER(S), INSERT STENT Left 2015    Procedure: COMBINED CYSTOSCOPY, RETROGRADES, URETEROSCOPY, LASER HOLMIUM LITHOTRIPSY URETER(S), INSERT STENT;  Surgeon: Yaakov Purcell MD;  [facility]     COMBINED CYSTOSCOPY, RETROGRADES, URETEROSCOPY, LASER HOLMIUM LITHOTRIPSY URETER(S), INSERT STENT N/A 2017    " Procedure: COMBINED CYSTOSCOPY, RETROGRADES, URETEROSCOPY, LASER HOLMIUM LITHOTRIPSY URETER(S), INSERT STENT;  Surgeon: Daniel Galeano MD;  [facility]     CYSTOSCOPY, RETROGRADES, INSERT STENT URETER(S), COMBINED Left 8/9/2016    Procedure: COMBINED CYSTOSCOPY, RETROGRADES, INSERT STENT URETER(S);  Surgeon: Daniel Galeano MD;  [facility]     CYSTOSCOPY, URETEROSCOPY, COMBINED  4/14/2011    Procedure:COMBINED CYSTOSCOPY, URETEROSCOPY; Holmium Laser Lithotripsy ; Surgeon:MAYTE PURCELL; [facility]     EXTRACORPOREAL SHOCK WAVE LITHOTRIPSY (ESWL)      right kidney     GYN SURGERY       HYSTERECTOMY       LASER HOLMIUM LITHOTRIPSY URETER(S), INSERT STENT, COMBINED  5/5/2011    Procedure:COMBINED CYSTOSCOPY, URETEROSCOPY, LASER HOLMIUM LITHOTRIPSY URETER(S), INSERT STENT; Surgeon:MAYTE PURCELL; [facility]     LASER HOLMIUM LITHOTRIPSY URETER(S), INSERT STENT, COMBINED  1/29/2014    Procedure: COMBINED CYSTOSCOPY, URETEROSCOPY, LASER HOLMIUM LITHOTRIPSY URETER(S), INSERT STENT;  Cystoscopy, Right Ureteroscopy With Holmium Laser, Right Stent Placement  ;  Surgeon: Mayte Purcell MD;  [facility]     LASER HOLMIUM LITHOTRIPSY URETER(S), INSERT STENT, COMBINED Left 6/17/2015    Procedure: COMBINED CYSTOSCOPY, URETEROSCOPY, LASER HOLMIUM LITHOTRIPSY URETER(S), INSERT STENT;  Surgeon: Mayte Purcell MD;  [facility]     LASER HOLMIUM LITHOTRIPSY URETER(S), INSERT STENT, COMBINED Bilateral 8/6/2015    Procedure: COMBINED CYSTOSCOPY, URETEROSCOPY, LASER HOLMIUM LITHOTRIPSY URETER(S), INSERT STENT;  Surgeon: Mayte Purcell MD;  [facility]     LASER HOLMIUM LITHOTRIPSY URETER(S), INSERT STENT, COMBINED Left 8/19/2015    Procedure: COMBINED CYSTOSCOPY, URETEROSCOPY, LASER HOLMIUM LITHOTRIPSY URETER(S), INSERT STENT;  Surgeon: Mayte Purcell MD;  [facility]     LASER HOLMIUM LITHOTRIPSY URETER(S), INSERT STENT, COMBINED Left 8/18/2016     Procedure: COMBINED CYSTOSCOPY, URETEROSCOPY, LASER HOLMIUM LITHOTRIPSY URETER(S), INSERT STENT;  Surgeon: Daniel Galeano MD;  [facility]     LASER HOLMIUM NEPHROLITHOTOMY VIA PERCUTANEOUS NEPHROSTOMY  10/20/2011    Procedure:LASER HOLMIUM NEPHROLITHOTOMY VIA PERCUTANEOUS NEPHROSTOMY; Left Holmium Laser of a Caliceal Diverticulum, Stent Placement with Balloon Dilatation, Ureteroscopy, Cystoscopy.; Surgeon:MAYTE BOWERS; [facility]     LASER HOLMIUM NEPHROLITHOTOMY VIA PERCUTANEOUS NEPHROSTOMY  5/5/2011    Procedure:LASER HOLMIUM NEPHROLITHOTOMY VIA PERCUTANEOUS NEPHROSTOMY; Ureteroscopy, retrograde pylogram, left stent placement.  laser not used, laser setup and not used.; Surgeon:MAYTE BOWERS; [facility]     PERCUTANEOUS NEPHROLITHOTOMY  4/14/2011    Procedure:PERCUTANEOUS NEPHROLITHOTOMY; Surgeon:MAYTE BOWERS; [facility]            Social History:     Social History   Substance Use Topics     Smoking status: Current Every Day Smoker     Packs/day: 1.00     Years: 40.00     Types: Cigarettes     Smokeless tobacco: Never Used     Alcohol use 0.6 - 1.2 oz/week     0 Standard drinks or equivalent, 1 - 2 Glasses of wine per week      Comment: monthly use            Family History:     Family History   Problem Relation Age of Onset     DIABETES Sister      DIABETES Mother      DIABETES Other      DIABETES Maternal Grandmother      DIABETES Maternal Grandfather      Prostate Cancer Father      Thyroid Disease Mother      Coronary Artery Disease No family hx of      Hypertension No family hx of      Hyperlipidemia No family hx of      CEREBROVASCULAR DISEASE No family hx of      Breast Cancer No family hx of      Colon Cancer No family hx of      Other Cancer No family hx of      Depression No family hx of      Anxiety Disorder No family hx of      MENTAL ILLNESS No family hx of      Substance Abuse No family hx of      Anesthesia Reaction No family hx of      Asthma No family  hx of      OSTEOPOROSIS No family hx of      Genetic Disorder No family hx of      Obesity No family hx of             Allergies:     Allergies   Allergen Reactions     Codeine Sulfate Nausea     Hydromorphone Itching     Generalized body itching; required discontinuing the drug     Pollen Extract      Runny nose            Medications:     Current Facility-Administered Medications   Medication     naloxone (NARCAN) injection 0.1-0.4 mg     melatonin tablet 1 mg     lidocaine 1 % 1 mL     lidocaine (LMX4) kit     sodium chloride (PF) 0.9% PF flush 3 mL     sodium chloride (PF) 0.9% PF flush 3 mL     docusate sodium (COLACE) capsule 100 mg     bisacodyl (DULCOLAX) EC tablet 5 mg    Or     bisacodyl (DULCOLAX) EC tablet 10 mg    Or     bisacodyl (DULCOLAX) EC tablet 15 mg     ondansetron (ZOFRAN-ODT) ODT tab 4 mg    Or     ondansetron (ZOFRAN) injection 4 mg     bisacodyl (DULCOLAX) Suppository 10 mg     phenazopyridine (PYRIDIUM) tablet 100 mg     hydrALAZINE (APRESOLINE) injection 10 mg     pantoprazole (PROTONIX) EC tablet 40 mg     lidocaine (viscous) (XYLOCAINE) 2 % 15 mL, alum & mag hydroxide-simethicone (MYLANTA ES/MAALOX  ES) 15 mL GI Cocktail     senna-docusate (SENOKOT-S;PERICOLACE) 8.6-50 MG per tablet 2 tablet     Lidocaine (LIDOCARE) 4 % Patch 1 patch    And     lidocaine patch REMOVAL    And     lidocaine patch in PLACE     nicotine Patch in Place     nicotine patch REMOVAL     nicotine (NICODERM CQ) 14 MG/24HR 24 hr patch 1 patch     cefTRIAXone (ROCEPHIN) 1 g in 10 mL SWFI Premix Syringe     oxyCODONE IR (ROXICODONE) tablet 5-10 mg     lactated ringers infusion             Review of Systems:    ROS: See HPI for pertinent details.  Remainder of 10-point ROS negative.          Physical Exam:   VS:  T: 97.3    HR: 71    BP: 134/76    RR: 16   GEN:  AOx3.  NAD.  Pleasant.  HEENT:  Sclerae anicteric.  Conjunctivae pink.  Moist mucous membranes  NECK:  Supple.  No lymphadenopathy.  LUNGS: Non-labored  breathing.  BACK:  No costoverterbral tenderness BL.  ABD:  Soft. ND.  + mild tenderness with deep palpation to the LLQ. No rebound or guarding.  No surgical scars. No masses.      EXT:  Warm, well perfused.  no lower extremity edema bilaterally  SKIN:  Warm.  Dry.  No rashes.  NEURO:  CN grossly intact.            Data:   All laboratory data reviewed:      Recent Labs  Lab 03/16/18  0525 03/15/18  1349   WBC 8.9 10.5   HGB 9.6* 12.2    493*       Recent Labs  Lab 03/16/18  0525 03/15/18  1349    140   POTASSIUM 3.8 3.7   CHLORIDE 110* 105   CO2 24 24   BUN 13 12   CR 0.76 0.71   GLC 77 101*   FARSHAD 7.6* 8.9       Recent Labs  Lab 03/15/18  2040   COLOR Yellow   APPEARANCE Clear   URINEGLC Negative   URINEBILI Negative   URINEKETONE Negative   SG 1.015   URINEPH 7.0   PROTEIN 10*   NITRITE Negative   LEUKEST Negative   RBCU 2   WBCU 1     Results for orders placed or performed during the hospital encounter of 03/15/18   Urine Culture   Result Value Ref Range    Specimen Description Midstream Urine     Special Requests Specimen received in preservative     Culture Micro PENDING    Results for orders placed or performed in visit on 03/15/17   Urine Culture Aerobic Bacterial   Result Value Ref Range    Specimen Description Midstream Urine     Special Requests Specimen received in preservative     Culture Micro (A)      50,000 to 100,000 colonies/mL Corynebacterium species Susceptibility testing not   routinely done  50,000 to 100,000 colonies/mL Coagulase negative Staphylococcus  10,000 to 50,000 colonies/mL Aerococcus urinae Identification obtained by   MALDI-TOF mass spectrometry research use only database. Test characteristics   determined and verified by the [facility] ([facility])   [place], [place].  10,000 to 50,000 colonies/mL Gram positive bacilli resembling Lactobacillus No   further identification Susceptibility testing not routinely done  10,000 to 50,000 colonies/mL Gram  positive bacilli resembling diphtheroids No   further identification Susceptibility testing not routinely done  10,000 to 50,000 colonies/mL Strain 2 Coagulase negative Staphylococcus  Susceptibility testing requested by Daniel Hernandes. Please do ID and   SENS.3/17/17 at [id number].      Micro Report Status FINAL 03/19/2017     Organism:       50,000 to 100,000 colonies/mL Coagulase negative Staphylococcus    Organism:       10,000 to 50,000 colonies/mL Strain 2 Coagulase negative Staphylococcus    Organism:       10,000 to 50,000 colonies/mL Aerococcus urinae Identification obtained by   MALDI-TOF mass spectrometry research use only database. Test characteristics   determined and verified by the [facility] Laboratory ([facility])   [place], [place].         Susceptibility    10,000 to 50,000 colonies/ml strain 2 coagulase negative staphylococcus (alexander) -  (no method available)     CIPROFLOXACIN >=8 Resistant  ug/mL     GENTAMICIN <=0.5 Susceptible  ug/mL     LEVOFLOXACIN 4 Resistant  ug/mL     NITROFURANTOIN <=16 Susceptible  ug/mL     OXACILLIN <=0.25 Susceptible  ug/mL     PENICILLIN >=0.5 Resistant  ug/mL     TETRACYCLINE 2 Susceptible  ug/mL     VANCOMYCIN 1 Susceptible  ug/mL    50,000 to 100,000 colonies/ml coagulase negative staphylococcus (alexander) -  (no method available)     CIPROFLOXACIN <=0.5 Susceptible  ug/mL     GENTAMICIN <=0.5 Susceptible  ug/mL     LEVOFLOXACIN 0.5 Susceptible  ug/mL     NITROFURANTOIN <=16 Susceptible  ug/mL     OXACILLIN <=0.25 Susceptible  ug/mL     PENICILLIN >2.0 Resistant  ug/mL     TETRACYCLINE 2 Susceptible  ug/mL     VANCOMYCIN <=0.5 Susceptible  ug/mL    10,000 to 50,000 colonies/ml aerococcus urinae identification obtained by  maldi-tof mass spectrometry research use only database. test characteristics  determined and verified by the infectious disea -  (no method available)     PENICILLIN <=0.03 Susceptible  ug/mL     VANCOMYCIN 0.25 Susceptible  ug/mL      TETRACYCLINE <=0.5 Susceptible  ug/mL     CEFOTAXIME <=0.25 Susceptible  ug/mL     CEFTRIAXONE <=0.25 Susceptible  ug/mL       All pertinent imaging reviewed:  EXAMINATION: CT ABDOMEN PELVIS W CONTRAST, 3/15/2018 3:56 PM     TECHNIQUE:  Helical CT images from the lung bases through the  symphysis pubis were obtained with contrast.  Coronal reformatted  images were generated at a workstation for further assessment.     CONTRAST:  111 cc Isovue 370 IV.     COMPARISON: 3/15/2017     HISTORY: abdominal pain/flank pain, fever, elevated lipase, eval for  pancreatitic abscess, h/o ureterolithiasis, ?recurrent stone;      FINDINGS:     Abdomen and pelvis:   Liver: No suspicious liver lesions. Portal veins appear patent.  Gallbladder: No gallstones. No evidence of acute cholecystitis.  Spleen: Normal size.  Pancreas: No suspicious pancreatic lesions. The pancreatic duct is not  dilated.  Adrenal glands: No adrenal nodules.  Kidneys: There is a 6 mm nonobstructing calyceal stone in the left  kidney. Multiple subcentimeter hypodensities bilaterally, too small to  characterize, likely simple cysts. There are bilateral wedge shaped  areas of hypoperfusion, some of which demonstrate overlying cortical  loss compatible with scarring. No hydronephrosis. Trace left greater  than right urothelial thickening/enhancement and mild haziness along  the renal pelves.  Bladder / Pelvic organs: The bladder is mostly decompressed with wall  thickening slightly greater than expected for nondistention. Left  ovarian corpus luteum. No suspicious adnexal mass.  Bowel: Diverticulosis without diverticulitis. No bowel wall  thickening. The appendix is unremarkable.  Lymph nodes: No retroperitoneal, mesenteric, or pelvic  lymphadenopathy.  Fluid: No free fluid within the abdomen.  Vessels: No infrarenal aortic aneurysm.      Lung bases: No consolidation or pleural effusion.     Bones and soft tissues: No suspicious osseous  lesions.         IMPRESSION:   1.  Bilateral left greater than right wedge-shaped renal parenchymal  hypodensities, some of which are compatible with scarring and some of  which are suspicious for pyelonephritis especially given the trace  urothelial thickening/enhancement and likely mild wall thickening of  the incompletely distended bladder.  2.  Nonobstructing 6 mm stone in the left kidney.  3.  Diverticulosis without diverticulitis.         Impression and Plan:   Impression / Plan:   Sophia Andrew is a 57 year old female who was admitted yesterday with acute pancreatitis, but there are also concerns for pyelonephritis based on CT findings and known history of UTIs. Today she is clinically improving with NPO, IVF and ceftriaxone. Her urine culture is pending, but her UA from yesterday was remarkable for likely vaginal contamination (with significant numbers of squamous epithelial cells).     She has a urologic history of kidney stones, hydrocalynx and stenotic infundibulum requiring multiple endoscopic urologic procedures, and she is followed by Dr. Galeano.  Her flank pain is L>R, chronic and non-worsening.  In the recent past, pain wasn't changed by antibiotics.       - Ms. Andrew was discussed with Dr. Galeano.  Her clinical picture and CT images are less suggestive of acute pyelonephritis and more suggestive of chronic calyceal drainage concerns, likely due to stenotic infundibulae.    - At this point no surgical intervention is advised.    - Ms. Andrew does have an upcoming appointment with Dr. Galeano in clinic on 4/17/18.  In addition to the KUB and renal ultrasound already scheduled, our Clinic coordinators will also order a NM renogram to assess for obstruction and differential function of her kidneys  (message sent to coordinators).     Urology will sign off but be available for any questions.     Discussed with Dr. Galeano and the patient    Thank you for the opportunity to participate in the  care of Sophia Andrew.     Elida Duarte PA-C  Urology Physician Assistant  Personal Pager: [phone]    Please call Job Code:   [phone] to reach the Urology resident or PA on call - Weekdays  [phone] to reach the Urology resident or PA on call - Weeknights and weekends

## 2018-03-16 NOTE — PROGRESS NOTES
Care Coordinator Progress Note     Admission Date/Time:  3/15/2018  Attending MD:  Bowen Heaton*     Data  Chart reviewed, discussed with interdisciplinary team.   Patient was admitted for: Acute pancreatitis, unspecified complication status, unspecified pancreatitis type.    Concerns with insurance coverage for discharge needs: None.  Current Living Situation: Patient lives with adult .  Support System: Supportive and Involved  Services Involved: none  Transportation: [place] transportation,  [employer] [phone]  Barriers to Discharge: chronically ill and pending medical clearance    Coordination of Care and Referrals: Provided patient/family with options for nothing at this time.        Assessment  Spoke with patient and her significant other.  Introduced RNCC role and discharge planning.  Patient lives with her significant other and states he can help provide assistance at home if needed.  Patient is up independently.  Patient states her SO will drive her home upon discharge.  He wants to have his parking validated.  Told patient to bring his parking ticket to the desk to be stamped.  No new needs anticipated for discharge at this time.  Will continue to follow.     Plan  Anticipated Discharge Date:  1-2 days  Anticipated Discharge Plan:  home    Tamara Acuña RN, BSN  Care Coordinator [facility]  Pager: [phone]  Phone: [phone]

## 2018-03-16 NOTE — PROGRESS NOTES
[facility], [place]    Internal Medicine Progress Note - Gold Service      Assessment & Plan   Sophia Andrew is a 57 year old female w/ a hx of HTN, recurrent Kidney stones s/p multiple procedures (cystoscopy, lithotripsy, and stent placement), chronic L flank/back pain, and tobacco use disorder who presents to the ED w/ bilateral flank pain, nausea, non-bloody emesis, fever, chills, HA's, epigastric pain and constipation.     # Bilateral flank pain, N/V, fever  # Pyelonephritis  2 week hx of bilateral L>R flank pain, dysuria, N/V, fevers and chills. S/p treatment for suspected UTI 2/21, complateted 7 day course of cipro. Tmax 99.9, no leukocytosis. CT A/P w/ bilateral L>R wedge-shaped renal parenchymal hypodensities- scarring vs pyelonephritis, non obstructing 6mm stone on L and diverticulosis w/o diverticulitis. Labs remarkable for Lipase 1058, amylase 171, UA w/ 6 WBC, moderate bacteria. UCx NGTD. Started ceftriaxone for possible pyelo.  - Continue Ceftriaxone until culture results   - Urology consult, appreciate recs  - Roxicodone 5-10mg Q4H prn    # Acute Pancreatitis. Presents w/ N/V epigastric pain with lipase 1058, amylase 171. S/p aggressive IVF, NPO with improvement in lipase to 316. Pancreatitis may be associated with long smoking hx. Drinks alcohol 2-3 x per week, but states only wine and not to excess. PTA on lisinopril-HCTZ, diuretic could be contributing. No dilation in pancreatic duct on CT/AP concerning for gallstone.  - Continue IVF  - ADAT  - will not resume HCTZ    Constipation. Straining BM's for the past few weeks. S/p suppository on admission.  - bowel regimen  - supp prn    HA. Developed a few months ago. Describes sharp occipital pain that comes and goes. Usually responsive to tylenol.   - tylenol/ibuprofen prn  - [facility]    Chronic LBP. Has followed w/ pain/palliatice in the past (7/2017). Pain located on L above SI joint.  MSK in nature.  - Continue lidoderm patch    HTN. BP mildly elevated on admission. PTA managed on lisinopril-HCTZ. Held on admission given concern for pyelonephritis, will not resume given pancreatitis.   - Start amlodipine 5 mg QD    HLD. Holding PTA statin.     # Pain Assessment:   Current Pain Score 3/16/2018 3/16/2018 3/15/2018   Patient currently in pain? yes yes yes   Pain score (0-10) - - -   Pain location Back Back Back   Pain descriptors Dull Dull Dull   - Sophia is experiencing pain due to bilateral flank pain. Pain management was discussed and the plan was created in a collaborative fashion.  Sophia's response to the current recommendations: engaged  - Please see the plan for pain management as documented above        Diet: Full Liquid Diet  Fluids: LR @ 100cc  DVT Prophylaxis: Pneumatic Compression Devices  Code Status: Full Code    Disposition Plan   Expected discharge: 2 - 3 days, recommended to prior living arrangement once adequate pain management/ tolerating PO medications and antibiotic plan established.     Entered: Sharron Mandujano 03/16/2018, 8:59 AM   Information in the above section will display in the discharge planner report.      The patient's care was discussed with the Attending Physician, Dr. Cintron.    Sharron Mandujano  Internal Medicine Staff Hospitalist Service  [facility]  Pager: [phone]  Please see sticky note for cross cover information    Interval History   Nausea/vomiting and epigastric pain improved since admission. Denies further fevers or chills. Continues to complain of mild bilateral flank pain, L>R which is well controlled with oral analgesics. Also with new HA. Denies chest pain, shortness of breath or difficulty breathing.       Data reviewed today: I reviewed all medications, new labs and imaging results over the last 24 hours.     Physical Exam   Vital Signs: Temp: 97.3  F (36.3  C) Temp src: Oral BP: 134/76 Pulse: 71 Heart Rate: 74 Resp: 16 SpO2: 100 %  O2 Device: None (Room air)    Weight: 178 lbs 2.11 oz  General Appearance: Alert and oriented x 3, NAD  Respiratory: Lungs CTAB, no wheezing or crackles  Cardiovascular: RRR, no murmurs or gallops  GI: Abdomen soft, tender to palpation in epigastric region, bilateral CVA tenderness L>R  Skin: warm and dry to touch, no rashes or excoriations  Other: no peripheral edema          Data   Data     Recent Labs  Lab 03/16/18  0525 03/15/18  1349   WBC 8.9 10.5   HGB 9.6* 12.2   MCV 90 86    493*    140   POTASSIUM 3.8 3.7   CHLORIDE 110* 105   CO2 24 24   BUN 13 12   CR 0.76 0.71   ANIONGAP 7 10   FARSHAD 7.6* 8.9   GLC 77 101*   ALBUMIN  --  3.9   PROTTOTAL  --  8.0   BILITOTAL  --  0.4   ALKPHOS  --  115   ALT  --  24   AST  --  15   LIPASE 316 1058*   TROPI  --  <0.015

## 2018-03-16 NOTE — PLAN OF CARE
Problem: Patient Care Overview  Goal: Plan of Care/Patient Progress Review  Outcome: Improving  D/I/A: Patient A & O X 4, VSS no respiratory distress noted. Patient C/o abdominal pain at early morning, and request for Oxycodone 5 mg po x 1. Post assessment with relief. Patient tolerated clear diet and request to advance her diet to regular, advanced her diet to low fat diet. And patient ate 75% and tolerated well, IVF infusing at 100 cc. Patient void adequate, and report had bowel movement x 1. Patient up independently. Continue with plan of care and update MD with change.

## 2018-03-17 VITALS
DIASTOLIC BLOOD PRESSURE: 75 MMHG | BODY MASS INDEX: 31.27 KG/M2 | TEMPERATURE: 98.4 F | RESPIRATION RATE: 16 BRPM | SYSTOLIC BLOOD PRESSURE: 135 MMHG | HEART RATE: 88 BPM | OXYGEN SATURATION: 99 % | WEIGHT: 176.5 LBS | HEIGHT: 63 IN

## 2018-03-17 LAB
ALBUMIN SERPL-MCNC: 3.2 G/DL (ref 3.4–5)
ALP SERPL-CCNC: 97 U/L (ref 40–150)
ALT SERPL W P-5'-P-CCNC: 58 U/L (ref 0–50)
ANION GAP SERPL CALCULATED.3IONS-SCNC: 6 MMOL/L (ref 3–14)
AST SERPL W P-5'-P-CCNC: 32 U/L (ref 0–45)
BILIRUB SERPL-MCNC: 0.3 MG/DL (ref 0.2–1.3)
BUN SERPL-MCNC: 6 MG/DL (ref 7–30)
CALCIUM SERPL-MCNC: 8.1 MG/DL (ref 8.5–10.1)
CHLORIDE SERPL-SCNC: 110 MMOL/L (ref 94–109)
CO2 SERPL-SCNC: 26 MMOL/L (ref 20–32)
CREAT SERPL-MCNC: 0.72 MG/DL (ref 0.52–1.04)
ERYTHROCYTE [DISTWIDTH] IN BLOOD BY AUTOMATED COUNT: 13.8 % (ref 10–15)
GFR SERPL CREATININE-BSD FRML MDRD: 84 ML/MIN/1.7M2
GLUCOSE SERPL-MCNC: 87 MG/DL (ref 70–99)
HCT VFR BLD AUTO: 31.3 % (ref 35–47)
HGB BLD-MCNC: 9.9 G/DL (ref 11.7–15.7)
INTERPRETATION ECG - MUSE: NORMAL
MCH RBC QN AUTO: 27.7 PG (ref 26.5–33)
MCHC RBC AUTO-ENTMCNC: 31.6 G/DL (ref 31.5–36.5)
MCV RBC AUTO: 88 FL (ref 78–100)
PLATELET # BLD AUTO: 380 10E9/L (ref 150–450)
POTASSIUM SERPL-SCNC: 3.6 MMOL/L (ref 3.4–5.3)
PROT SERPL-MCNC: 6.3 G/DL (ref 6.8–8.8)
RBC # BLD AUTO: 3.57 10E12/L (ref 3.8–5.2)
SODIUM SERPL-SCNC: 142 MMOL/L (ref 133–144)
WBC # BLD AUTO: 9.2 10E9/L (ref 4–11)

## 2018-03-17 PROCEDURE — 40000141 ZZH STATISTIC PERIPHERAL IV START W/O US GUIDANCE

## 2018-03-17 PROCEDURE — 80053 COMPREHEN METABOLIC PANEL: CPT | Performed by: PHYSICIAN ASSISTANT

## 2018-03-17 PROCEDURE — 99239 HOSP IP/OBS DSCHRG MGMT >30: CPT | Performed by: INTERNAL MEDICINE

## 2018-03-17 PROCEDURE — 25000132 ZZH RX MED GY IP 250 OP 250 PS 637: Performed by: PHYSICIAN ASSISTANT

## 2018-03-17 PROCEDURE — 36415 COLL VENOUS BLD VENIPUNCTURE: CPT | Performed by: PHYSICIAN ASSISTANT

## 2018-03-17 PROCEDURE — 40000802 ZZH SITE CHECK

## 2018-03-17 PROCEDURE — 25000128 H RX IP 250 OP 636: Performed by: PHYSICIAN ASSISTANT

## 2018-03-17 PROCEDURE — 85027 COMPLETE CBC AUTOMATED: CPT | Performed by: PHYSICIAN ASSISTANT

## 2018-03-17 RX ORDER — LISINOPRIL 10 MG/1
10 TABLET ORAL DAILY
Qty: 30 TABLET | Refills: 0 | Status: SHIPPED | OUTPATIENT
Start: 2018-03-17

## 2018-03-17 RX ORDER — POLYETHYLENE GLYCOL 3350 17 G/17G
17 POWDER, FOR SOLUTION ORAL DAILY PRN
Status: DISCONTINUED | OUTPATIENT
Start: 2018-03-17 | End: 2018-03-17 | Stop reason: HOSPADM

## 2018-03-17 RX ORDER — AMLODIPINE BESYLATE 5 MG/1
5 TABLET ORAL DAILY
Qty: 30 TABLET | Refills: 0 | Status: SHIPPED | OUTPATIENT
Start: 2018-03-18

## 2018-03-17 RX ADMIN — PHENAZOPYRIDINE HYDROCHLORIDE 100 MG: 100 TABLET, FILM COATED ORAL at 12:29

## 2018-03-17 RX ADMIN — AMLODIPINE BESYLATE 5 MG: 5 TABLET ORAL at 08:36

## 2018-03-17 RX ADMIN — NICOTINE 1 PATCH: 14 PATCH, EXTENDED RELEASE TRANSDERMAL at 08:36

## 2018-03-17 RX ADMIN — OXYCODONE HYDROCHLORIDE 5 MG: 5 TABLET ORAL at 05:21

## 2018-03-17 RX ADMIN — DOCUSATE SODIUM 100 MG: 100 CAPSULE, LIQUID FILLED ORAL at 08:37

## 2018-03-17 RX ADMIN — PHENAZOPYRIDINE HYDROCHLORIDE 100 MG: 100 TABLET, FILM COATED ORAL at 08:36

## 2018-03-17 RX ADMIN — OXYCODONE HYDROCHLORIDE 5 MG: 5 TABLET ORAL at 15:05

## 2018-03-17 RX ADMIN — POLYETHYLENE GLYCOL 3350 17 G: 17 POWDER, FOR SOLUTION ORAL at 15:08

## 2018-03-17 RX ADMIN — PANTOPRAZOLE SODIUM 40 MG: 40 TABLET, DELAYED RELEASE ORAL at 08:36

## 2018-03-17 RX ADMIN — SODIUM CHLORIDE, POTASSIUM CHLORIDE, SODIUM LACTATE AND CALCIUM CHLORIDE: 600; 310; 30; 20 INJECTION, SOLUTION INTRAVENOUS at 00:03

## 2018-03-17 ASSESSMENT — PAIN DESCRIPTION - DESCRIPTORS: DESCRIPTORS: ACHING;SORE

## 2018-03-17 NOTE — PLAN OF CARE
Problem: Patient Care Overview  Goal: Plan of Care/Patient Progress Review  Outcome: Adequate for Discharge Date Met: 03/17/18  Patient stable and had discharge order to go home. Patient aware and agreeable with plan.writer instruct patient to f/u with appointment as scheduled. And instruct patient to  meds from discharge Pharmacy her way out . Patient verbalized understanding. Patient left the unit at ~ 17:00 . Patient took all her belonging with.

## 2018-03-17 NOTE — PLAN OF CARE
Problem: Patient Care Overview  Goal: Plan of Care/Patient Progress Review  Outcome: No Change  Pt A/O, VSS. Up independently in room and hallways. PRN oxy adequately controlling pain. Tolerating regular diet. IV antibiotic and IVF continue per order. Voiding adequate amounts, reported BM today. Lidocaine patch applied to left flank. Will continue to monitor overnight and update team with any new concerns.

## 2018-03-17 NOTE — PLAN OF CARE
Problem: Patient Care Overview  Goal: Plan of Care/Patient Progress Review  Outcome: Improving  Pt A/O. VSS. Bathroom voiding adequate amounts. LR infusing per order. Slept throughout the night. No new concerns reported. PRN oxy given x 1 for abdominal pain. Up independently. Will continue with plan of care.

## 2018-03-17 NOTE — DISCHARGE SUMMARY
[facility], [place]    Internal Medicine Discharge Summary- Gold Service    Date of Admission:  3/15/2018  Date of Discharge:  3/17/2018  Discharging Provider: Sharron Mandujano PA-C/ Dr. Cintron  Discharge Team: Gold 2    Discharge Diagnoses   Bilateral flank pain, N/V, fever  Acute pancreatitis  Constipation  HA  Chronic LBP  HTN  HLD    Follow-ups Needed After Discharge   F/u w/ PCP w/in one week, adjust BP meds if needed  F/u w/ Urology 4/17/18    Hospital Course   Sophia Andrew is a 57 year old female w/ a hx of HTN, recurrent Kidney stones s/p multiple procedures (cystoscopy, lithotripsy, and stent placement), chronic L flank/back pain, and tobacco use disorder who presents to the ED w/ bilateral flank pain, nausea, non-bloody emesis, fever, chills, HA's, epigastric pain and constipation.      # Acute Pancreatitis. Presents w/ N/V epigastric pain with lipase 1058, amylase 171. S/p aggressive IVF, NPO with improvement in lipase to 316. Pancreatitis may be associated with long smoking hx. Drinks alcohol 2-3 x per week, but states only wine and not to excess. PTA on lisinopril-HCTZ, diuretic could be contributing. No dilation in pancreatic duct on CT/AP concerning for gallstone. Advanced diet. At discharge able to tolerate regular diet without N/V/ or abdominal pain.   - Held HCTZ on discharge  - Encouraged smoking cessation and abstinence from alcohol to prevent further episodes    # Bilateral flank pain, N/V, fever  # Chronic L flank pain  # Hx of L calyceal diverticulum   2 week hx of bilateral L>R flank pain, dysuria, N/V, fevers and chills. S/p treatment for suspected UTI 2/21, complateted 7 day course of cipro. Tmax 99.9, no leukocytosis. CT A/P w/ bilateral L>R wedge-shaped renal parenchymal hypodensities- scarring vs pyelonephritis, non obstructing 6mm stone on L and diverticulosis w/o diverticulitis. Labs remarkable for Lipase 1058, amylase 171, UA w/ 6 WBC, moderate  bacteria. UCx NGTD. Started ceftriaxone for possible pyelo. Seen by urology who felt that clinical picture and CT images were less suggestive of acute pyelonephritis and more suggestive of chronic calcyeal drainage concerns, likely due to stenotic infundibulae. Discontinue ceftriaxone. Pain resolved at time of discharge.  - F/u with Urology as scheduled 4/17/18  - KUB, Renal US, NM renogram orders placed to be obtained prior to appointment    HTN. BP mildly elevated on admission. PTA managed on lisinopril-HCTZ. Held on admission given concern for pyelonephritis, will not resume given pancreatitis. Discharged on lisinopril 10 mg QD and amlodipine 5 mg QD.   - F/u with PCP within one week for repeat BP and ongoing management      Constipation. Straining BM's for the past few weeks. S/p suppository on admission. With good response.     HA. Developed a few months ago. Describes sharp occipital pain that comes and goes. Usually responsive to tylenol.   - PCP to consider further w/u with imaging outpatient     Chronic LBP. Has followed w/ pain/palliatice in the past (7/2017). Pain located on L above SI joint. MSK in nature. Managed with Lidocaine patch while inpatient.      HLD. Resumed PTA statin on discharge.      - Ms. Andrew was discussed with Dr. Galeano.  Her clinical picture and CT images are less suggestive of acute pyelonephritis and more suggestive of chronic calyceal drainage concerns, likely due to stenotic infundibulae.    - At this point no surgical intervention is advised.    - Ms. Andrew does have an upcoming appointment with Dr. Galeano in clinic on 4/17/18.  In addition to the KUB and renal ultrasound already scheduled, our Clinic coordinators will also order a NM renogram to assess for obstruction and differential function of her kidneys  (message sent to coordinators).     # Discharge Pain Plan:   - Patient currently has NO PAIN and is not being prescribed pain medications on  discharge.    Consultations This Hospital Stay   MEDICATION HISTORY IP PHARMACY CONSULT  UROLOGY IP CONSULT  VASCULAR ACCESS CARE ADULT IP CONSULT     Code Status   Full Code    Time Spent on this Encounter   I, Sharron Mandujano, personally saw the patient today and spent greater than 30 minutes discharging this patient.       Sharron Mandujano  Internal Medicine Staff Hospitalist Service  [facility]  Pager: [phone]  ______________________________________________________________________    Physical Exam   Vital Signs: Temp: 98  F (36.7  C) Temp src: Oral BP: 151/89 Pulse: 82 Heart Rate: 72 Resp: 16 SpO2: 100 % O2 Device: None (Room air)    Weight: 176 lbs 8 oz    General Appearance: Alert and oriented x 3, NAD  Respiratory: Lungs CTAB, no wheezing or crackles  Cardiovascular: RRR, no murmurs or gallops  GI: Abdomen soft, non distended, non tender to palpation  Skin: warm and dry to touch, no rashes or excoriations  Other: no peripheral edema    Significant Results and Procedures   Most Recent 3 CBC's:  Recent Labs   Lab Test  03/17/18   0527  03/16/18   0525  03/15/18   1349   WBC  9.2  8.9  10.5   HGB  9.9*  9.6*  12.2   MCV  88  90  86   PLT  380  348  493*       Pending Results   These results will be followed up by n/a  Unresulted Labs Ordered in the Past 30 Days of this Admission     No orders found from 1/14/2018 to 3/16/2018.             Primary Care Physician   Mirlande Fuentes    Discharge Disposition   Discharged to home  Condition at discharge: Stable    Discharge Orders     NM Lasix renogram       Discharge Medications   Current Discharge Medication List      START taking these medications    Details   amLODIPine (NORVASC) 5 MG tablet Take 1 tablet (5 mg) by mouth daily  Qty: 30 tablet, Refills: 0    Associated Diagnoses: Benign essential hypertension      lisinopril (PRINIVIL/ZESTRIL) 10 MG tablet Take 1 tablet (10 mg) by mouth daily  Qty: 30 tablet, Refills: 0    Associated  Diagnoses: Benign essential hypertension         CONTINUE these medications which have NOT CHANGED    Details   ATORVASTATIN CALCIUM PO Take 20 mg by mouth daily          STOP taking these medications       lisinopril-hydrochlorothiazide (PRINZIDE/ZESTORETIC) 10-12.5 MG per tablet Comments:   Reason for Stopping:             Allergies   Allergies   Allergen Reactions     Codeine Sulfate Nausea     Hydromorphone Itching     Generalized body itching; required discontinuing the drug     Pollen Extract      Runny nose

## 2018-03-17 NOTE — PLAN OF CARE
Problem: Patient Care Overview  Goal: Plan of Care/Patient Progress Review  Outcome: Improving  D/I/A: Patient A & O X 4, VSS no respiratory distress noted, left back pain manageable with Oxycodone PRN. Patient with good appetite. Void adequate and had bowel movement yesterday. Plan to discharge today and awaiting for meds refill. Continue carry on as order put in.

## 2018-03-19 ENCOUNTER — CARE COORDINATION (OUTPATIENT)
Dept: CARE COORDINATION | Facility: CLINIC | Age: 58
End: 2018-03-19

## 2018-04-17 ENCOUNTER — OFFICE VISIT (OUTPATIENT)
Dept: UROLOGY | Facility: CLINIC | Age: 58
End: 2018-04-17
Payer: COMMERCIAL

## 2018-04-17 VITALS
BODY MASS INDEX: 31.18 KG/M2 | SYSTOLIC BLOOD PRESSURE: 133 MMHG | HEIGHT: 63 IN | DIASTOLIC BLOOD PRESSURE: 78 MMHG | WEIGHT: 176 LBS | HEART RATE: 78 BPM

## 2018-04-17 DIAGNOSIS — R10.9 FLANK PAIN: Primary | ICD-10-CM

## 2018-04-17 RX ORDER — HYDROCODONE BITARTRATE AND ACETAMINOPHEN 5; 325 MG/1; MG/1
TABLET ORAL
Refills: 0 | COMMUNITY
Start: 2018-02-21

## 2018-04-17 RX ORDER — ONDANSETRON 8 MG/1
TABLET, FILM COATED ORAL
Refills: 0 | COMMUNITY
Start: 2018-02-21

## 2018-04-17 RX ORDER — CIPROFLOXACIN 250 MG/1
TABLET, FILM COATED ORAL
Refills: 0 | COMMUNITY
Start: 2018-02-21

## 2018-04-17 RX ORDER — PHENAZOPYRIDINE HYDROCHLORIDE 200 MG/1
TABLET, FILM COATED ORAL
Refills: 0 | COMMUNITY
Start: 2018-02-21

## 2018-04-17 RX ORDER — LISINOPRIL/HYDROCHLOROTHIAZIDE 10-12.5 MG
TABLET ORAL
Refills: 11 | COMMUNITY
Start: 2018-03-26

## 2018-04-17 ASSESSMENT — PAIN SCALES - GENERAL: PAINLEVEL: MODERATE PAIN (5)

## 2018-04-17 NOTE — PATIENT INSTRUCTIONS
Follow up with Dr. Galeano in one year with imaging.    It was a pleasure meeting with you today.  Thank you for allowing me and my team the privilege of caring for you today.  YOU are the reason we are here, and I truly hope we provided you with the excellent service you deserve.  Please let us know if there is anything else we can do for you so that we can be sure you are leaving completely satisfied with your care experience.

## 2018-04-17 NOTE — LETTER
"4/17/2018       RE: Sophia Andrew  [address]  [facility] [address]     Dear Colleague,    Thank you for referring your patient, Sophia Andrew, to the [facility] UROLOGY AND [facility] [[email or web address]] FOR PROSTATE AND UROLOGIC CANCERS at [facility]. Please see a copy of my visit note below.      UROLOGY FOLLOW-UP NOTE          Chief Complaint:   Today I had the pleasure of seeing Ms. Sophia Andrew in follow-up for a chief complaint of kidney stones.          Interval Update    Sophia Anderw is a very pleasant 57 year old female     Brief  History: She is a brushite stone former with chronic left flank pain.  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  It was unable to be identified ureteroscopically and was radiolucent on C-arm fluoroscopy so PCNL was not able to be performed.    Today notes:   Today she continues to endorse left-sided flank pain, this is chronic, no worse than it has been in years past.  Remains frustrating to her buut manageable and does not interfere with daily life.    She reports a recent UTI last month along with fever and nausea/vomiting. She was admitted to the hospital and ultimately diagnsoed with pancreatitis.  States she is feeling better since being discharged     From a stone prevention perspective she has been adherent to recommendations to maintain high fluid diet.  Recent CT while she was admitted shows no new stones, continues to demonstrate calcification in left mid pole, slightly bigger than from prior imaging. There were also some wedge shaped hypodensities in the kidney of unclear etiology, possibly suggestive of hydrocalyces.  She was supposed to have a lasix renal scan but did not get this.          Physical Exam:   Patient is a 57 year old  female   Vitals: Blood pressure 133/78, pulse 78, height 1.6 m (5' 3\"), weight 79.8 kg (176 lb), not currently " breastfeeding.  Notable Findings on Exam well-nourished female in no apparent distress         Labs and Pathology:    I personally reviewed all applicable laboratory data and went over findings with patient  Significant for:    CBC RESULTS:  Recent Labs   Lab Test  03/17/18   0527  03/16/18   0525  03/15/18   1349  03/15/17   1431   WBC  9.2  8.9  10.5  10.5   HGB  9.9*  9.6*  12.2  13.4   PLT  380  348  493*  365        BMP RESULTS:  Recent Labs   Lab Test  03/17/18   0527 03/16/18   0525  03/15/18   1349  03/15/17   1431   NA  142  141  140  139   POTASSIUM  3.6  3.8  3.7  3.7   CHLORIDE  110*  110*  105  103   CO2  26  24  24  26   ANIONGAP  6  7  10  11   GLC  87  77  101*  157*   BUN  6*  13  12  15   CR  0.72  0.76  0.71  0.69   GFRESTIMATED  84  79  85  88   GFRESTBLACK  >90  >90  >90  >90  African American GFR Calc     FARSHAD  8.1*  7.6*  8.9  9.0       UA RESULTS:   Recent Labs   Lab Test  03/15/18   2040  03/15/18   1404  03/15/17   1445   SG  1.015  1.018  1.018   URINEPH  7.0  6.5  6.0   NITRITE  Negative  Negative  Negative   RBCU  2  2  1   WBCU  1  6*  7*         Imaging:    I personally reviewed all applicable imaging and went over the below findings with patient.    Results for orders placed or performed during the hospital encounter of 03/15/18   POC US ABDOMEN LIMITED    Impression    Exam cancelled   CT Abdomen Pelvis w Contrast    Narrative    EXAMINATION: CT ABDOMEN PELVIS W CONTRAST, 3/15/2018 3:56 PM    TECHNIQUE:  Helical CT images from the lung bases through the  symphysis pubis were obtained with contrast.  Coronal reformatted  images were generated at a workstation for further assessment.    CONTRAST:  111 cc Isovue 370 IV.    COMPARISON: 3/15/2017    HISTORY: abdominal pain/flank pain, fever, elevated lipase, eval for  pancreatitic abscess, h/o ureterolithiasis, ?recurrent stone;     FINDINGS:    Abdomen and pelvis:   Liver: No suspicious liver lesions. Portal veins appear  patent.  Gallbladder: No gallstones. No evidence of acute cholecystitis.  Spleen: Normal size.  Pancreas: No suspicious pancreatic lesions. The pancreatic duct is not  dilated.  Adrenal glands: No adrenal nodules.  Kidneys: There is a 6 mm nonobstructing calyceal stone in the left  kidney. Multiple subcentimeter hypodensities bilaterally, too small to  characterize, likely simple cysts. There are bilateral wedge shaped  areas of hypoperfusion, some of which demonstrate overlying cortical  loss compatible with scarring. No hydronephrosis. Trace left greater  than right urothelial thickening/enhancement and mild haziness along  the renal pelves.  Bladder / Pelvic organs: The bladder is mostly decompressed with wall  thickening slightly greater than expected for nondistention. Left  ovarian corpus luteum. No suspicious adnexal mass.  Bowel: Diverticulosis without diverticulitis. No bowel wall  thickening. The appendix is unremarkable.  Lymph nodes: No retroperitoneal, mesenteric, or pelvic  lymphadenopathy.  Fluid: No free fluid within the abdomen.  Vessels: No infrarenal aortic aneurysm.     Lung bases: No consolidation or pleural effusion.    Bones and soft tissues: No suspicious osseous lesions.      Impression    IMPRESSION:   1.  Bilateral left greater than right wedge-shaped renal parenchymal  hypodensities, some of which are compatible with scarring and some of  which are suspicious for pyelonephritis especially given the trace  urothelial thickening/enhancement and likely mild wall thickening of  the incompletely distended bladder.  2.  Nonobstructing 6 mm stone in the left kidney.  3.  Diverticulosis without diverticulitis.    I have personally reviewed the examination and initial interpretation  and I agree with the findings.    TONY COLON MD            Assessment/Plan   57 year old female w/ hx of brushite kidney stones and chronic left flank pain with stone vs nephrocalcinosis  -  Today we readdressed  treatment options including another attempt at ureteroscopy as well as PCNLwith IR who have higher resolution imaging modalities to identify stone. She is not interested in this at this time.   - Follow up in 1 year with XR KUB and Renal US         Past Medical History:     Past Medical History:   Diagnosis Date     Chronic left flank pain      Hypertension      Kidney stones 2011    nephrostomy tube     Tobacco abuse disorder             Past Surgical History:     Past Surgical History:   Procedure Laterality Date      SECTION  X2      SECTION      x2     COMBINED CYSTOSCOPY, INSERT STENT URETER(S)  1/15/2014    Procedure: COMBINED CYSTOSCOPY, INSERT STENT URETER(S);  Cystoscopy, Right ureteral stent placement;  Surgeon: Patience Coy MD;  [facility]     COMBINED CYSTOSCOPY, INSERT STENT URETER(S) Left 2015    Procedure: COMBINED CYSTOSCOPY, INSERT STENT URETER(S);  Surgeon: Marisol Grubbs MD;  [facility]     COMBINED CYSTOSCOPY, RETROGRADES, URETEROSCOPY, LASER HOLMIUM LITHOTRIPSY URETER(S), INSERT STENT Left 2015    Procedure: COMBINED CYSTOSCOPY, RETROGRADES, URETEROSCOPY, LASER HOLMIUM LITHOTRIPSY URETER(S), INSERT STENT;  Surgeon: Yaakov Purcell MD;  [facility]     COMBINED CYSTOSCOPY, RETROGRADES, URETEROSCOPY, LASER HOLMIUM LITHOTRIPSY URETER(S), INSERT STENT Left 2015    Procedure: COMBINED CYSTOSCOPY, RETROGRADES, URETEROSCOPY, LASER HOLMIUM LITHOTRIPSY URETER(S), INSERT STENT;  Surgeon: Yaakov Purcell MD;  [facility]     COMBINED CYSTOSCOPY, RETROGRADES, URETEROSCOPY, LASER HOLMIUM LITHOTRIPSY URETER(S), INSERT STENT N/A 2017    Procedure: COMBINED CYSTOSCOPY, RETROGRADES, URETEROSCOPY, LASER HOLMIUM LITHOTRIPSY URETER(S), INSERT STENT;  Surgeon: Daniel Galeano MD;  [facility]     CYSTOSCOPY, RETROGRADES, INSERT STENT URETER(S), COMBINED Left 2016    Procedure: COMBINED CYSTOSCOPY, RETROGRADES,  INSERT STENT URETER(S);  Surgeon: Daniel Galeano MD;  [facility]     CYSTOSCOPY, URETEROSCOPY, COMBINED  4/14/2011    Procedure:COMBINED CYSTOSCOPY, URETEROSCOPY; Holmium Laser Lithotripsy ; Surgeon:MAYTE PURCELL; [facility]     EXTRACORPOREAL SHOCK WAVE LITHOTRIPSY (ESWL)      right kidney     GYN SURGERY       HYSTERECTOMY       LASER HOLMIUM LITHOTRIPSY URETER(S), INSERT STENT, COMBINED  5/5/2011    Procedure:COMBINED CYSTOSCOPY, URETEROSCOPY, LASER HOLMIUM LITHOTRIPSY URETER(S), INSERT STENT; Surgeon:MAYTE PURCELL; [facility]     LASER HOLMIUM LITHOTRIPSY URETER(S), INSERT STENT, COMBINED  1/29/2014    Procedure: COMBINED CYSTOSCOPY, URETEROSCOPY, LASER HOLMIUM LITHOTRIPSY URETER(S), INSERT STENT;  Cystoscopy, Right Ureteroscopy With Holmium Laser, Right Stent Placement  ;  Surgeon: Mayte Purcell MD;  [facility]     LASER HOLMIUM LITHOTRIPSY URETER(S), INSERT STENT, COMBINED Left 6/17/2015    Procedure: COMBINED CYSTOSCOPY, URETEROSCOPY, LASER HOLMIUM LITHOTRIPSY URETER(S), INSERT STENT;  Surgeon: Mayte Purcell MD;  [facility]     LASER HOLMIUM LITHOTRIPSY URETER(S), INSERT STENT, COMBINED Bilateral 8/6/2015    Procedure: COMBINED CYSTOSCOPY, URETEROSCOPY, LASER HOLMIUM LITHOTRIPSY URETER(S), INSERT STENT;  Surgeon: Mayte Purcell MD;  [facility]     LASER HOLMIUM LITHOTRIPSY URETER(S), INSERT STENT, COMBINED Left 8/19/2015    Procedure: COMBINED CYSTOSCOPY, URETEROSCOPY, LASER HOLMIUM LITHOTRIPSY URETER(S), INSERT STENT;  Surgeon: Mayte Purcell MD;  [facility]     LASER HOLMIUM LITHOTRIPSY URETER(S), INSERT STENT, COMBINED Left 8/18/2016    Procedure: COMBINED CYSTOSCOPY, URETEROSCOPY, LASER HOLMIUM LITHOTRIPSY URETER(S), INSERT STENT;  Surgeon: Daniel Galeano MD;  [facility]     LASER HOLMIUM NEPHROLITHOTOMY VIA PERCUTANEOUS NEPHROSTOMY  10/20/2011    Procedure:LASER HOLMIUM NEPHROLITHOTOMY VIA PERCUTANEOUS  NEPHROSTOMY; Left Holmium Laser of a Caliceal Diverticulum, Stent Placement with Balloon Dilatation, Ureteroscopy, Cystoscopy.; Surgeon:MAYTE BOWERS; [facility]     LASER HOLMIUM NEPHROLITHOTOMY VIA PERCUTANEOUS NEPHROSTOMY  5/5/2011    Procedure:LASER HOLMIUM NEPHROLITHOTOMY VIA PERCUTANEOUS NEPHROSTOMY; Ureteroscopy, retrograde pylogram, left stent placement.  laser not used, laser setup and not used.; Surgeon:MAYTE BOWERS; [facility]     PERCUTANEOUS NEPHROLITHOTOMY  4/14/2011    Procedure:PERCUTANEOUS NEPHROLITHOTOMY; Surgeon:MAYTE BOWERS; [facility]            Medications     Current Outpatient Prescriptions   Medication     ciprofloxacin (CIPRO) 250 MG tablet     HYDROcodone-acetaminophen (NORCO) 5-325 MG per tablet     lisinopril-hydrochlorothiazide (PRINZIDE/ZESTORETIC) 10-12.5 MG per tablet     ondansetron (ZOFRAN) 8 MG tablet     phenazopyridine (PYRIDIUM) 200 MG tablet     amLODIPine (NORVASC) 5 MG tablet     lisinopril (PRINIVIL/ZESTRIL) 10 MG tablet     ATORVASTATIN CALCIUM PO     No current facility-administered medications for this visit.             Family History:     Family History   Problem Relation Age of Onset     DIABETES Sister      DIABETES Mother      DIABETES Other      DIABETES Maternal Grandmother      DIABETES Maternal Grandfather      Prostate Cancer Father      Thyroid Disease Mother      Coronary Artery Disease No family hx of      Hypertension No family hx of      Hyperlipidemia No family hx of      CEREBROVASCULAR DISEASE No family hx of      Breast Cancer No family hx of      Colon Cancer No family hx of      Other Cancer No family hx of      Depression No family hx of      Anxiety Disorder No family hx of      MENTAL ILLNESS No family hx of      Substance Abuse No family hx of      Anesthesia Reaction No family hx of      Asthma No family hx of      OSTEOPOROSIS No family hx of      Genetic Disorder No family hx of      Obesity No family hx of              Social History:     Social History     Social History     Marital status: Single     Spouse name: N/A     Number of children: N/A     Years of education: N/A     Occupational History     Not on file.     Social History Main Topics     Smoking status: Current Every Day Smoker     Packs/day: 1.00     Years: 40.00     Types: Cigarettes     Smokeless tobacco: Never Used     Alcohol use 0.6 - 1.2 oz/week     0 Standard drinks or equivalent, 1 - 2 Glasses of wine per week      Comment: monthly use     Drug use: No     Sexual activity: No     Other Topics Concern     Not on file     Social History Narrative    Single.  Engaged.      Works at [employer]    2 children - 1 son with hypothyroid    7 siblings - 1 sister with history of CVA.          Mother :  with history of blood clots., diabetes mellitus, hypertension, heart disease    Father living (hx of cancer)        No family history of bleeding, clotting disorders or complications with anesthesia.                Allergies:   Codeine; Pollen extract; Codeine sulfate; and Hydromorphone         Review of Systems:  From intake questionnaire   Negative 14 system review except as noted on HPI, nurse's note.    Scribe Disclosure:   Lis SPANGLER, am serving as a scribe; to document services personally performed by Daniel Galeano MD based on data collection and the provider's statements to me.     IDaniel saw and evaluated this patient and agree with the plan as stated above.  I personally performed all listed procedures.      >15 minutes were spent face to face with patient over half of which was spent providing medical counseling regarding flank pain, kidney stone    Again, thank you for allowing me to participate in the care of your patient.      Sincerely,    Daniel Galeano MD    CC:  Mirlande Fuentes

## 2018-04-17 NOTE — NURSING NOTE
"Chief Complaint   Patient presents with     RECHECK     Patient coming in for kidney stone check.        Blood pressure 133/78, pulse 78, height 1.6 m (5' 3\"), weight 79.8 kg (176 lb), not currently breastfeeding. Body mass index is 31.18 kg/(m^2).    Patient Active Problem List   Diagnosis     Calculus of kidney     Hydronephrosis     Ureteral stone     Nephrolithiasis     Sepsis (H)     Kidney stone     Acute renal failure (H)     Pancreatitis       Allergies   Allergen Reactions     Codeine Hives     Other reaction(s): Nausea     Pollen Extract      Other reaction(s): Runny Nose  Runny nose     Codeine Sulfate Nausea     Hydromorphone Itching     Generalized body itching; required discontinuing the drug       Current Outpatient Prescriptions   Medication Sig Dispense Refill     ciprofloxacin (CIPRO) 250 MG tablet TK 1 T PO BID  0     HYDROcodone-acetaminophen (NORCO) 5-325 MG per tablet TK 1 T PO TID PRN PAIN  0     lisinopril-hydrochlorothiazide (PRINZIDE/ZESTORETIC) 10-12.5 MG per tablet TK 1 T PO QD  11     ondansetron (ZOFRAN) 8 MG tablet TK 1 T PO Q 8 H PRN  0     phenazopyridine (PYRIDIUM) 200 MG tablet TK 1 T PO TID WITH MEALS  0     amLODIPine (NORVASC) 5 MG tablet Take 1 tablet (5 mg) by mouth daily 30 tablet 0     lisinopril (PRINIVIL/ZESTRIL) 10 MG tablet Take 1 tablet (10 mg) by mouth daily 30 tablet 0     ATORVASTATIN CALCIUM PO Take 20 mg by mouth daily          Social History   Substance Use Topics     Smoking status: Current Every Day Smoker     Packs/day: 1.00     Years: 40.00     Types: Cigarettes     Smokeless tobacco: Never Used     Alcohol use 0.6 - 1.2 oz/week     0 Standard drinks or equivalent, 1 - 2 Glasses of wine per week      Comment: monthly use       Rachael Rodríguez LPN  4/17/2018  3:23 PM    "

## 2018-04-17 NOTE — MR AVS SNAPSHOT
After Visit Summary   4/17/2018    Sophia Andrew    MRN: [id number]           Patient Information     Date Of Birth          1960        Visit Information        Provider Department      4/17/2018 3:20 PM Daniel Galeano MD [facility] Urology and [facility] for Prostate and Urologic [facility]        Today's Diagnoses     Flank pain    -  1      Care Instructions    Follow up with Dr. Galeano in one year with imaging.    It was a pleasure meeting with you today.  Thank you for allowing me and my team the privilege of caring for you today.  YOU are the reason we are here, and I truly hope we provided you with the excellent service you deserve.  Please let us know if there is anything else we can do for you so that we can be sure you are leaving completely satisfied with your care experience.              Follow-ups after your visit        Who to contact     Please call your clinic at [phone] to:    Ask questions about your health    Make or cancel appointments    Discuss your medicines    Learn about your test results    Speak to your doctor            Additional Information About Your Visit        [employer] Information     [email or web address] gives you secure access to your electronic health record. If you see a primary care provider, you can also send messages to your care team and make appointments. If you have questions, please call your primary care clinic.  If you do not have a primary care provider, please call [phone] and they will assist you.      [email or web address] is an electronic gateway that provides easy, online access to your medical records. With [email or web address], you can request a clinic appointment, read your test results, renew a prescription or communicate with your care team.     To access your existing account, please contact your [facility] or call [phone] for assistance.        Care EveryWhere ID     This is your Care EveryWhere ID. This could be used by other  "organizations to access your [facility] medical records  [id number]        Your Vitals Were     Pulse Height BMI (Body Mass Index)             78 1.6 m (5' 3\") 31.18 kg/m2          Blood Pressure from Last 3 Encounters:   04/17/18 133/78   03/17/18 135/75   07/19/17 (!) 165/99    Weight from Last 3 Encounters:   04/17/18 79.8 kg (176 lb)   03/16/18 80.1 kg (176 lb 8 oz)   07/19/17 82.1 kg (181 lb)              Information about OPIOIDS     PRESCRIPTION OPIOIDS: WHAT YOU NEED TO KNOW   You have a prescription for an opioid (narcotic) pain medicine. Opioids can cause addiction. If you have a history of chemical dependency of any type, you are at a higher risk of becoming addicted to opioids. Only take this medicine after all other options have been tried. Take it for as short a time and as few doses as possible.     Do not:    Drive. If you drive while taking these medicines, you could be arrested for driving under the influence (DUI).    Operate heavy machinery    Do any other dangerous activities while taking these medicines.     Drink any alcohol while taking these medicines.      Take with any other medicines that contain acetaminophen. Read all labels carefully. Look for the word  acetaminophen  or  Tylenol.  Ask your pharmacist if you have questions or are unsure.    Store your pills in a secure place, locked if possible. We will not replace any lost or stolen medicine. If you don t finish your medicine, please throw away (dispose) as directed by your pharmacist. The [place] Pollution Control Agency has more information about safe disposal: https://www.pca.[facility].[place].us/living-green/managing-unwanted-medications    All opioids tend to cause constipation. Drink plenty of water and eat foods that have a lot of fiber, such as fruits, vegetables, prune juice, apple juice and high-fiber cereal. Take a laxative (Miralax, milk of magnesia, Colace, Senna) if you don t move your bowels at least every other day.          " Primary Care Provider Office Phone # Fax #    Mirlande Fuentes [phone]       [facility] [address] [address]        Equal Access to Services     ETTA CHONG : Hadja elfego ku korio Sotyali, waaxda luqadaha, qaybta kaalmada adeegyada, waxbernard idiin hayaan thomas collins laasaf louise. So [facility] [phone].    ATENCIÓN: Si habla español, tiene a schreiber disposición servicios gratuitos de asistencia lingüística. Llame al [phone].    We comply with applicable federal civil rights laws and [place] laws. We do not discriminate on the basis of race, color, national origin, age, disability, sex, sexual orientation, or gender identity.            Thank you!     Thank you for choosing [facility] UROLOGY AND [facility] FOR PROSTATE AND UROLOGIC CANCERS  for your care. Our goal is always to provide you with excellent care. Hearing back from our patients is one way we can continue to improve our services. Please take a few minutes to complete the written survey that you may receive in the mail after your visit with us. Thank you!             Your Updated Medication List - Protect others around you: Learn how to safely use, store and throw away your medicines at www.disposemymeds.org.          This list is accurate as of 4/17/18 11:59 PM.  Always use your most recent med list.                   Brand Name Dispense Instructions for use Diagnosis    amLODIPine 5 MG tablet    NORVASC    30 tablet    Take 1 tablet (5 mg) by mouth daily    Benign essential hypertension       ATORVASTATIN CALCIUM PO      Take 20 mg by mouth daily        ciprofloxacin 250 MG tablet    CIPRO     TK 1 T PO BID        HYDROcodone-acetaminophen 5-325 MG per tablet    NORCO     TK 1 T PO TID PRN PAIN        lisinopril 10 MG tablet    PRINIVIL/ZESTRIL    30 tablet    Take 1 tablet (10 mg) by mouth daily    Benign essential hypertension       lisinopril-hydrochlorothiazide 10-12.5 MG per tablet     PRINZIDE/ZESTORETIC     TK 1 T PO QD        ondansetron 8 MG tablet    ZOFRAN     TK 1 T PO Q 8 H PRN        phenazopyridine 200 MG tablet    PYRIDIUM     TK 1 T PO TID WITH MEALS

## 2018-04-17 NOTE — PROGRESS NOTES
"  UROLOGY FOLLOW-UP NOTE          Chief Complaint:   Today I had the pleasure of seeing Ms. Sophia Andrew in follow-up for a chief complaint of kidney stones.          Interval Update    Sophia Andrew is a very pleasant 57 year old female     Brief  History: She is a brushite stone former with chronic left flank pain.  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  It was unable to be identified ureteroscopically and was radiolucent on C-arm fluoroscopy so PCNL was not able to be performed.    Today notes:   Today she continues to endorse left-sided flank pain, this is chronic, no worse than it has been in years past.  Remains frustrating to her buut manageable and does not interfere with daily life.    She reports a recent UTI last month along with fever and nausea/vomiting. She was admitted to the hospital and ultimately diagnsoed with pancreatitis.  States she is feeling better since being discharged     From a stone prevention perspective she has been adherent to recommendations to maintain high fluid diet.  Recent CT while she was admitted shows no new stones, continues to demonstrate calcification in left mid pole, slightly bigger than from prior imaging. There were also some wedge shaped hypodensities in the kidney of unclear etiology, possibly suggestive of hydrocalyces.  She was supposed to have a lasix renal scan but did not get this.          Physical Exam:   Patient is a 57 year old  female   Vitals: Blood pressure 133/78, pulse 78, height 1.6 m (5' 3\"), weight 79.8 kg (176 lb), not currently breastfeeding.  Notable Findings on Exam well-nourished female in no apparent distress         Labs and Pathology:    I personally reviewed all applicable laboratory data and went over findings with patient  Significant for:    CBC RESULTS:  Recent Labs   Lab Test  03/17/18   0527  03/16/18   0525  03/15/18   1349  03/15/17   1431   WBC  9.2  " 8.9  10.5  10.5   HGB  9.9*  9.6*  12.2  13.4   PLT  380  348  493*  365        BMP RESULTS:  Recent Labs   Lab Test  03/17/18   0527  03/16/18   0525  03/15/18   1349  03/15/17   1431   NA  142  141  140  139   POTASSIUM  3.6  3.8  3.7  3.7   CHLORIDE  110*  110*  105  103   CO2  26  24  24  26   ANIONGAP  6  7  10  11   GLC  87  77  101*  157*   BUN  6*  13  12  15   CR  0.72  0.76  0.71  0.69   GFRESTIMATED  84  79  85  88   GFRESTBLACK  >90  >90  >90  >90  African American GFR Calc     FARSHAD  8.1*  7.6*  8.9  9.0       UA RESULTS:   Recent Labs   Lab Test  03/15/18   2040  03/15/18   1404  03/15/17   1445   SG  1.015  1.018  1.018   URINEPH  7.0  6.5  6.0   NITRITE  Negative  Negative  Negative   RBCU  2  2  1   WBCU  1  6*  7*         Imaging:    I personally reviewed all applicable imaging and went over the below findings with patient.    Results for orders placed or performed during the hospital encounter of 03/15/18   POC US ABDOMEN LIMITED    Impression    Exam cancelled   CT Abdomen Pelvis w Contrast    Narrative    EXAMINATION: CT ABDOMEN PELVIS W CONTRAST, 3/15/2018 3:56 PM    TECHNIQUE:  Helical CT images from the lung bases through the  symphysis pubis were obtained with contrast.  Coronal reformatted  images were generated at a workstation for further assessment.    CONTRAST:  111 cc Isovue 370 IV.    COMPARISON: 3/15/2017    HISTORY: abdominal pain/flank pain, fever, elevated lipase, eval for  pancreatitic abscess, h/o ureterolithiasis, ?recurrent stone;     FINDINGS:    Abdomen and pelvis:   Liver: No suspicious liver lesions. Portal veins appear patent.  Gallbladder: No gallstones. No evidence of acute cholecystitis.  Spleen: Normal size.  Pancreas: No suspicious pancreatic lesions. The pancreatic duct is not  dilated.  Adrenal glands: No adrenal nodules.  Kidneys: There is a 6 mm nonobstructing calyceal stone in the left  kidney. Multiple subcentimeter hypodensities bilaterally, too small  to  characterize, likely simple cysts. There are bilateral wedge shaped  areas of hypoperfusion, some of which demonstrate overlying cortical  loss compatible with scarring. No hydronephrosis. Trace left greater  than right urothelial thickening/enhancement and mild haziness along  the renal pelves.  Bladder / Pelvic organs: The bladder is mostly decompressed with wall  thickening slightly greater than expected for nondistention. Left  ovarian corpus luteum. No suspicious adnexal mass.  Bowel: Diverticulosis without diverticulitis. No bowel wall  thickening. The appendix is unremarkable.  Lymph nodes: No retroperitoneal, mesenteric, or pelvic  lymphadenopathy.  Fluid: No free fluid within the abdomen.  Vessels: No infrarenal aortic aneurysm.     Lung bases: No consolidation or pleural effusion.    Bones and soft tissues: No suspicious osseous lesions.      Impression    IMPRESSION:   1.  Bilateral left greater than right wedge-shaped renal parenchymal  hypodensities, some of which are compatible with scarring and some of  which are suspicious for pyelonephritis especially given the trace  urothelial thickening/enhancement and likely mild wall thickening of  the incompletely distended bladder.  2.  Nonobstructing 6 mm stone in the left kidney.  3.  Diverticulosis without diverticulitis.    I have personally reviewed the examination and initial interpretation  and I agree with the findings.    TONY COLON MD            Assessment/Plan   57 year old female w/ hx of brushite kidney stones and chronic left flank pain with stone vs nephrocalcinosis  -  Today we readdressed treatment options including another attempt at ureteroscopy as well as PCNLwith IR who have higher resolution imaging modalities to identify stone. She is not interested in this at this time.   - Follow up in 1 year with XR KUB and Renal US         Past Medical History:     Past Medical History:   Diagnosis Date     Chronic left flank pain       Hypertension      Kidney stones 2011    nephrostomy tube     Tobacco abuse disorder             Past Surgical History:     Past Surgical History:   Procedure Laterality Date      SECTION  X2      SECTION      x2     COMBINED CYSTOSCOPY, INSERT STENT URETER(S)  1/15/2014    Procedure: COMBINED CYSTOSCOPY, INSERT STENT URETER(S);  Cystoscopy, Right ureteral stent placement;  Surgeon: Paitence Coy MD;  [facility]     COMBINED CYSTOSCOPY, INSERT STENT URETER(S) Left 2015    Procedure: COMBINED CYSTOSCOPY, INSERT STENT URETER(S);  Surgeon: Marisol Grubbs MD;  [facility]     COMBINED CYSTOSCOPY, RETROGRADES, URETEROSCOPY, LASER HOLMIUM LITHOTRIPSY URETER(S), INSERT STENT Left 2015    Procedure: COMBINED CYSTOSCOPY, RETROGRADES, URETEROSCOPY, LASER HOLMIUM LITHOTRIPSY URETER(S), INSERT STENT;  Surgeon: Mayte Bowers MD;  [facility]     COMBINED CYSTOSCOPY, RETROGRADES, URETEROSCOPY, LASER HOLMIUM LITHOTRIPSY URETER(S), INSERT STENT Left 2015    Procedure: COMBINED CYSTOSCOPY, RETROGRADES, URETEROSCOPY, LASER HOLMIUM LITHOTRIPSY URETER(S), INSERT STENT;  Surgeon: Mayte Bowers MD;  [facility]     COMBINED CYSTOSCOPY, RETROGRADES, URETEROSCOPY, LASER HOLMIUM LITHOTRIPSY URETER(S), INSERT STENT N/A 2017    Procedure: COMBINED CYSTOSCOPY, RETROGRADES, URETEROSCOPY, LASER HOLMIUM LITHOTRIPSY URETER(S), INSERT STENT;  Surgeon: Daniel Galeano MD;  [facility]     CYSTOSCOPY, RETROGRADES, INSERT STENT URETER(S), COMBINED Left 2016    Procedure: COMBINED CYSTOSCOPY, RETROGRADES, INSERT STENT URETER(S);  Surgeon: Daniel Galeano MD;  [facility]     CYSTOSCOPY, URETEROSCOPY, COMBINED  2011    Procedure:COMBINED CYSTOSCOPY, URETEROSCOPY; Holmium Laser Lithotripsy ; Surgeon:MAYTE BOWERS; [facility]     EXTRACORPOREAL SHOCK WAVE LITHOTRIPSY (ESWL)      right kidney     GYN SURGERY       HYSTERECTOMY        LASER HOLMIUM LITHOTRIPSY URETER(S), INSERT STENT, COMBINED  5/5/2011    Procedure:COMBINED CYSTOSCOPY, URETEROSCOPY, LASER HOLMIUM LITHOTRIPSY URETER(S), INSERT STENT; Surgeon:MAYTE BOWERS; [facility]     LASER HOLMIUM LITHOTRIPSY URETER(S), INSERT STENT, COMBINED  1/29/2014    Procedure: COMBINED CYSTOSCOPY, URETEROSCOPY, LASER HOLMIUM LITHOTRIPSY URETER(S), INSERT STENT;  Cystoscopy, Right Ureteroscopy With Holmium Laser, Right Stent Placement  ;  Surgeon: Mayte Bowers MD;  [facility]     LASER HOLMIUM LITHOTRIPSY URETER(S), INSERT STENT, COMBINED Left 6/17/2015    Procedure: COMBINED CYSTOSCOPY, URETEROSCOPY, LASER HOLMIUM LITHOTRIPSY URETER(S), INSERT STENT;  Surgeon: Mayte Bowers MD;  [facility]     LASER HOLMIUM LITHOTRIPSY URETER(S), INSERT STENT, COMBINED Bilateral 8/6/2015    Procedure: COMBINED CYSTOSCOPY, URETEROSCOPY, LASER HOLMIUM LITHOTRIPSY URETER(S), INSERT STENT;  Surgeon: Mayte Bowers MD;  [facility]     LASER HOLMIUM LITHOTRIPSY URETER(S), INSERT STENT, COMBINED Left 8/19/2015    Procedure: COMBINED CYSTOSCOPY, URETEROSCOPY, LASER HOLMIUM LITHOTRIPSY URETER(S), INSERT STENT;  Surgeon: Mayte Bowers MD;  [facility]     LASER HOLMIUM LITHOTRIPSY URETER(S), INSERT STENT, COMBINED Left 8/18/2016    Procedure: COMBINED CYSTOSCOPY, URETEROSCOPY, LASER HOLMIUM LITHOTRIPSY URETER(S), INSERT STENT;  Surgeon: Daniel Galeano MD;  [facility]     LASER HOLMIUM NEPHROLITHOTOMY VIA PERCUTANEOUS NEPHROSTOMY  10/20/2011    Procedure:LASER HOLMIUM NEPHROLITHOTOMY VIA PERCUTANEOUS NEPHROSTOMY; Left Holmium Laser of a Caliceal Diverticulum, Stent Placement with Balloon Dilatation, Ureteroscopy, Cystoscopy.; Surgeon:MAYTE BOWERS; [facility]     LASER HOLMIUM NEPHROLITHOTOMY VIA PERCUTANEOUS NEPHROSTOMY  5/5/2011    Procedure:LASER HOLMIUM NEPHROLITHOTOMY VIA PERCUTANEOUS NEPHROSTOMY; Ureteroscopy, retrograde pylogram, left  stent placement.  laser not used, laser setup and not used.; Surgeon:MAYTE BOWERS; [facility]     PERCUTANEOUS NEPHROLITHOTOMY  4/14/2011    Procedure:PERCUTANEOUS NEPHROLITHOTOMY; Surgeon:MAYTE BOWERS; [facility]            Medications     Current Outpatient Prescriptions   Medication     ciprofloxacin (CIPRO) 250 MG tablet     HYDROcodone-acetaminophen (NORCO) 5-325 MG per tablet     lisinopril-hydrochlorothiazide (PRINZIDE/ZESTORETIC) 10-12.5 MG per tablet     ondansetron (ZOFRAN) 8 MG tablet     phenazopyridine (PYRIDIUM) 200 MG tablet     amLODIPine (NORVASC) 5 MG tablet     lisinopril (PRINIVIL/ZESTRIL) 10 MG tablet     ATORVASTATIN CALCIUM PO     No current facility-administered medications for this visit.             Family History:     Family History   Problem Relation Age of Onset     DIABETES Sister      DIABETES Mother      DIABETES Other      DIABETES Maternal Grandmother      DIABETES Maternal Grandfather      Prostate Cancer Father      Thyroid Disease Mother      Coronary Artery Disease No family hx of      Hypertension No family hx of      Hyperlipidemia No family hx of      CEREBROVASCULAR DISEASE No family hx of      Breast Cancer No family hx of      Colon Cancer No family hx of      Other Cancer No family hx of      Depression No family hx of      Anxiety Disorder No family hx of      MENTAL ILLNESS No family hx of      Substance Abuse No family hx of      Anesthesia Reaction No family hx of      Asthma No family hx of      OSTEOPOROSIS No family hx of      Genetic Disorder No family hx of      Obesity No family hx of             Social History:     Social History     Social History     Marital status: Single     Spouse name: N/A     Number of children: N/A     Years of education: N/A     Occupational History     Not on file.     Social History Main Topics     Smoking status: Current Every Day Smoker     Packs/day: 1.00     Years: 40.00     Types: Cigarettes     Smokeless  tobacco: Never Used     Alcohol use 0.6 - 1.2 oz/week     0 Standard drinks or equivalent, 1 - 2 Glasses of wine per week      Comment: monthly use     Drug use: No     Sexual activity: No     Other Topics Concern     Not on file     Social History Narrative    Single.  Engaged.      Works at [employer]    2 children - 1 son with hypothyroid    7 siblings - 1 sister with history of CVA.          Mother :  with history of blood clots., diabetes mellitus, hypertension, heart disease    Father living (hx of cancer)        No family history of bleeding, clotting disorders or complications with anesthesia.                Allergies:   Codeine; Pollen extract; Codeine sulfate; and Hydromorphone         Review of Systems:  From intake questionnaire   Negative 14 system review except as noted on HPI, nurse's note.    Scribe Disclosure:   ILis, am serving as a scribe; to document services personally performed by Daniel Galeano MD based on data collection and the provider's statements to me.     I, Daniel Galeano saw and evaluated this patient and agree with the plan as stated above.  I personally performed all listed procedures.    CC:  Mirlande Fuentes      >15 minutes were spent face to face with patient over half of which was spent providing medical counseling regarding flank pain, kidney stone

## 2018-04-18 ENCOUNTER — TELEPHONE (OUTPATIENT)
Dept: UROLOGY | Facility: CLINIC | Age: 58
End: 2018-04-18

## 2018-04-18 NOTE — TELEPHONE ENCOUNTER
Patient requesting lidocaine ointment 5%  720grams needs to apply 1-2 grams 2-4 times per day as needed.    Also fluconinocide !% cream 360 grams 1-2 grams daily 4 times      oked by dr ellsworth. Yadira Cano LPN Staff Nurse

## 2018-10-19 ENCOUNTER — THERAPY VISIT (OUTPATIENT)
Dept: PHYSICAL THERAPY | Facility: CLINIC | Age: 58
End: 2018-10-19
Payer: COMMERCIAL

## 2018-10-19 DIAGNOSIS — M54.42 CHRONIC LEFT-SIDED LOW BACK PAIN WITH LEFT-SIDED SCIATICA: Primary | ICD-10-CM

## 2018-10-19 DIAGNOSIS — G89.29 CHRONIC LEFT-SIDED LOW BACK PAIN WITH LEFT-SIDED SCIATICA: Primary | ICD-10-CM

## 2018-10-19 PROCEDURE — 97161 PT EVAL LOW COMPLEX 20 MIN: CPT | Mod: GP | Performed by: PHYSICAL THERAPIST

## 2018-10-19 PROCEDURE — 97035 APP MDLTY 1+ULTRASOUND EA 15: CPT | Mod: GP | Performed by: PHYSICAL THERAPIST

## 2018-10-19 PROCEDURE — 97110 THERAPEUTIC EXERCISES: CPT | Mod: GP | Performed by: PHYSICAL THERAPIST

## 2018-10-19 NOTE — LETTER
[facility] PHYSICAL THERAPY   [address] [address]  [phone]    2018    Re: Sophia Andrew   :   1960  MRN:  [id number]   REFERRING PHYSICIAN:   Mirlande Fuentes    [facility] PHYSICAL [facility]  Date of Initial Evaluation:  10/19/18  Visits:  Rxs Used: 1  Reason for Referral:  Chronic left-sided low back pain with left-sided sciatica    EVALUATION SUMMARY  [facility] Initial Evaluation  Subjective:  Patient is a 57 year old female presenting with rehab back hpi.   Sophia Andrew is a 57 year old female with a lumbar condition.  Condition occurred with:  Repetition/overuse (Pt. had an onset of significant L sided LBP during a long bus trip t several days she could barely move or function. The pain has reduced some since then but is still present with sitting and transfers. Pt. has a hx of LBP off/on x 3 years.).  Condition occurred: in the community.  This is a new condition  18.    Patient reports pain:  Lumbar spine left.  Radiates to:  Gluteals left.  Pain is described as sharp and is intermittent and reported as 6/10.  Associated symptoms:  Loss of motion/stiffness and loss of strength. Pain is the same all the time.  Symptoms are exacerbated by sitting, twisting and bending (transfers) and relieved by rest.  Since onset symptoms are gradually improving.  Special testing: none.  Previous treatment: none.    General health as reported by patient is good.                  Objective:  Standing Alignment:    Lumbar:  Lordosis decr  Gait:  Slight limp on L  Gait Type:  Antalgic     Flexibility/Screens:   Positive screens:  Lumbar       Lumbar/SI Evaluation  ROM:    AROM Lumbar:   Flexion:            75%  Ext:                    Normal   Side Bend:        Left:  Normal    Right:  75%  Rotation:           Left:  75%    Right:  75%  Side Glide:        Left:     Right:    Strength: lower abdominals 4-/5; L hip abd 4-/5; ext 4-/5  Lumbar Myotomes:  normal  Lumbar Dermtomes:  normal      Re: Sophia Andrew   :   1960    Neural Tension/Mobility:    Left side:  SLR positive.  Left side:Slump or Piero-Lumbar  negative.   Lumbar Palpation:  Palpation (lumbar): diffuse myofascial tightness/ tenderness in L LS/ buttock musculature.  Tenderness present at Left:    Quadratus Lumborum; Erector Spinae and Piriformis  Spinal Segmental Conclusions: No pain with P/A glide lumbar spine  Level: Hypo noted at L4 and L5    Assessment/Plan:    Patient is a 57 year old female with lumbar complaints.    Patient has the following significant findings with corresponding treatment plan.                Diagnosis 1:  LBP  Pain -  US, manual therapy, self management and education  Decreased ROM/flexibility - manual therapy and therapeutic exercise  Decreased strength - therapeutic exercise and therapeutic activities  Impaired muscle performance - neuro re-education  Decreased function - therapeutic activities    Therapy Evaluation Codes:   1) History comprised of:   Personal factors that impact the plan of care:      None.    Comorbidity factors that impact the plan of care are:      None.     Medications impacting care: None.  2) Examination of Body Systems comprised of:   Body structures and functions that impact the plan of care:      Lumbar spine.   Activity limitations that impact the plan of care are:      Sitting and transfers.  3) Clinical presentation characteristics are:   Stable/Uncomplicated.  4) Decision-Making    Low complexity using standardized patient assessment instrument and/or measureable assessment of functional outcome.  Cumulative Therapy Evaluation is: Low complexity.  Previous and current functional limitations:  (See Goal Flow Sheet for this information)    Short term and Long term goals: (See Goal Flow Sheet for this information)   Communication ability:  Patient appears to  be able to clearly communicate and understand verbal and written communication and follow directions correctly.  Treatment Explanation - The following has been discussed with the patient:   RX ordered/plan of care  Anticipated outcomes  Possible risks and side effects  This patient would benefit from PT intervention to resume normal activities.   Rehab potential is good.          Re: Sophia Andrew   :   1960    Frequency:  1 X week, once daily  Duration:  for 6 weeks  Discharge Plan:  Achieve all LTG.  Independent in home treatment program.  Reach maximal therapeutic benefit.    Thank you for your referral.    INQUIRIES  Therapist:Sudha Fofana   [facility] PH[facility]  [address] [place] [id number]  Phone: [phone]  Fax: [phone]

## 2018-10-19 NOTE — MR AVS SNAPSHOT
After Visit Summary   10/19/2018    Sophia Andrew    MRN: [id number]           Patient Information     Date Of Birth          1960        Visit Information        Provider Department      10/19/2018 11:30 AM Sudha Fofana PT [facility] Physical Therapy        Today's Diagnoses     Chronic left-sided low back pain with left-sided sciatica    -  1       Follow-ups after your visit        Your next 10 appointments already scheduled     Nov 05, 2018  3:20 PM CST   [facility] Spine with Sudha Fofana PT   [facility] Physical Therapy ([facility]  )    [address] [place] [id number] [phone]              Who to contact     If you have questions or need follow up information about today's clinic visit or your schedule please contact [facility] PHYSICAL THERAPY directly at [phone].  Normal or non-critical lab and imaging results will be communicated to you by [employer], letter or phone within 4 business days after the clinic has received the results. If you do not hear from us within 7 days, please contact the clinic through [employer] or phone. If you have a critical or abnormal lab result, we will notify you by phone as soon as possible.  Submit refill requests through [employer] or call your pharmacy and they will forward the refill request to us. Please allow 3 business days for your refill to be completed.          Additional Information About Your Visit        MyChart Information     [employer] gives you secure access to your electronic health record. If you see a primary care provider, you can also send messages to your care team and make appointments. If you have questions, please call your primary care clinic.  If you do not have a primary care provider, please call [phone] and they will assist you.        Care EveryWhere ID     This is your Care EveryWhere ID. This  could be used by other organizations to access your [facility] medical records  [id number]         Blood Pressure from Last 3 Encounters:   04/17/18 133/78   03/17/18 135/75   07/19/17 (!) 165/99    Weight from Last 3 Encounters:   04/17/18 79.8 kg (176 lb)   03/16/18 80.1 kg (176 lb 8 oz)   07/19/17 82.1 kg (181 lb)              We Performed the Following     ZAFAR Inital Eval Report     PT Eval, Low Complexity (60424)     Therapeutic Exercises     Ultrasound Therapy        Primary Care Provider Office Phone # Fax #    Mirlande FUCHS [place] [phone]       [facility] [address] [facility] [address]        Equal Access to Services     ETTA CHONG : Hadii elfego murphy hadasho Soomaali, waaxda luqadaha, qaybta kaalmada adeegyada, waxbernard hayward . So [facility] [phone].    ATENCIÓN: Si habla español, tiene a schreiber disposición servicios gratuitos de asistencia lingüística. Llame al [phone].    We comply with applicable federal civil rights laws and [place] laws. We do not discriminate on the basis of race, color, national origin, age, disability, sex, sexual orientation, or gender identity.            Thank you!     Thank you for choosing [facility] PHYSICAL THERAPY  for your care. Our goal is always to provide you with excellent care. Hearing back from our patients is one way we can continue to improve our services. Please take a few minutes to complete the written survey that you may receive in the mail after your visit with us. Thank you!             Your Updated Medication List - Protect others around you: Learn how to safely use, store and throw away your medicines at www.disposemymeds.org.          This list is accurate as of 10/19/18  2:36 PM.  Always use your most recent med list.                   Brand Name Dispense Instructions for use Diagnosis    amLODIPine 5 MG tablet    NORVASC    30 tablet    Take 1 tablet (5  mg) by mouth daily    Benign essential hypertension       ATORVASTATIN CALCIUM PO      Take 20 mg by mouth daily        ciprofloxacin 250 MG tablet    CIPRO     TK 1 T PO BID        HYDROcodone-acetaminophen 5-325 MG per tablet    NORCO     TK 1 T PO TID PRN PAIN        lisinopril 10 MG tablet    PRINIVIL/ZESTRIL    30 tablet    Take 1 tablet (10 mg) by mouth daily    Benign essential hypertension       lisinopril-hydrochlorothiazide 10-12.5 MG per tablet    PRINZIDE/ZESTORETIC     TK 1 T PO QD        ondansetron 8 MG tablet    ZOFRAN     TK 1 T PO Q 8 H PRN        phenazopyridine 200 MG tablet    PYRIDIUM     TK 1 T PO TID WITH MEALS

## 2018-10-19 NOTE — PROGRESS NOTES
[facility] Initial Evaluation        Subjective:  Patient is a 57 year old female presenting with rehab back hpi.   Sophia Andrew is a 57 year old female with a lumbar condition.  Condition occurred with:  Repetition/overuse (Pt. had an onset of significant L sided LBP during a long bus trip 9-1. Fot several days she could barely move or function. The pain has reduced some since then but is still present with sitting and transfers. Pt. has a hx of LBP off/on x 3 years.).  Condition occurred: in the community.  This is a new condition  9-1-18.    Patient reports pain:  Lumbar spine left.  Radiates to:  Gluteals left.  Pain is described as sharp and is intermittent and reported as 6/10.  Associated symptoms:  Loss of motion/stiffness and loss of strength. Pain is the same all the time.  Symptoms are exacerbated by sitting, twisting and bending (transfers) and relieved by rest.  Since onset symptoms are gradually improving.  Special testing: none.  Previous treatment: none.    General health as reported by patient is good.                                              Objective:  Standing Alignment:        Lumbar:  Lordosis decr            Gait:  Slight limp on L  Gait Type:  Antalgic         Flexibility/Screens:   Positive screens:  Lumbar                   Lumbar/SI Evaluation  ROM:    AROM Lumbar:   Flexion:            75%  Ext:                    Normal   Side Bend:        Left:  Normal    Right:  75%  Rotation:           Left:  75%    Right:  75%  Side Glide:        Left:     Right:         Strength: lower abdominals 4-/5; L hip abd 4-/5; ext 4-/5  Lumbar Myotomes:  normal                Lumbar Dermtomes:  normal                Neural Tension/Mobility:    Left side:  SLR positive.  Left side:Slump or Piero-Lumbar  negative.     Lumbar Palpation:  Palpation (lumbar): diffuse myofascial tightness/ tenderness in L LS/ buttock musculature.  Tenderness present at Left:    Quadratus Lumborum;  Erector Spinae and Piriformis        Spinal Segmental Conclusions: No pain with P/A glide lumbar spine    Level: Hypo noted at L4 and L5                                                   General     ROS    Assessment/Plan:    Patient is a 57 year old female with lumbar complaints.    Patient has the following significant findings with corresponding treatment plan.                Diagnosis 1:  LBP  Pain -  US, manual therapy, self management and education  Decreased ROM/flexibility - manual therapy and therapeutic exercise  Decreased strength - therapeutic exercise and therapeutic activities  Impaired muscle performance - neuro re-education  Decreased function - therapeutic activities    Therapy Evaluation Codes:   1) History comprised of:   Personal factors that impact the plan of care:      None.    Comorbidity factors that impact the plan of care are:      None.     Medications impacting care: None.  2) Examination of Body Systems comprised of:   Body structures and functions that impact the plan of care:      Lumbar spine.   Activity limitations that impact the plan of care are:      Sitting and transfers.  3) Clinical presentation characteristics are:   Stable/Uncomplicated.  4) Decision-Making    Low complexity using standardized patient assessment instrument and/or measureable assessment of functional outcome.  Cumulative Therapy Evaluation is: Low complexity.    Previous and current functional limitations:  (See Goal Flow Sheet for this information)    Short term and Long term goals: (See Goal Flow Sheet for this information)     Communication ability:  Patient appears to be able to clearly communicate and understand verbal and written communication and follow directions correctly.  Treatment Explanation - The following has been discussed with the patient:   RX ordered/plan of care  Anticipated outcomes  Possible risks and side effects  This patient would benefit from PT intervention to resume normal  activities.   Rehab potential is good.    Frequency:  1 X week, once daily  Duration:  for 6 weeks  Discharge Plan:  Achieve all LTG.  Independent in home treatment program.  Reach maximal therapeutic benefit.    Please refer to the daily flowsheet for treatment today, total treatment time and time spent performing 1:1 timed codes.

## 2019-11-03 ENCOUNTER — HEALTH MAINTENANCE LETTER (OUTPATIENT)
Age: 59
End: 2019-11-03

## 2020-10-07 NOTE — LETTER
April 4, 2017    Sophia Andrew  [address]  [place] [address]    Dear Sophia                                                                 Welcome to the [facility].  We are located on the 2nd floor ([address]) of the [facility], located at [address] [place] [address] [place] [address].    Your appointment at the [facility] has been scheduled on Wednesday MAY 31, 2017 at 1:00 PM with Rafaela Tejada NP and Daniel Townsend LP    At your first visit, you will meet your team of caregivers who will help you to develop pain management strategies that will last a lifetime. You will meet with our support staff to review your insurance information, and collect your co-payment if required by your insurance company. You will also meet with a medical pain specialist, health psychologist, and care coordinator who will assess your pain and develop a plan of care for your successful pain rehabilitation. You should expect to spend 2-3 hours at your first visit with us. Usually, patients work with us for a period of 6-12 months, and eventually return to their primary doctor once their pain management has stabilized.     To help us make your visit go as smoothly as possible, please bring the following items with you on your visit:     Completed Pain Questionnaire enclosed in this packet.  If you do not bring the completed questionnaire, we may have to reschedule your appointment.  List of any medicines that you are currently taking or have been prescribed  Important NON-[place] medical information such as medical records or tests results (X-rays, or laboratory tests)  Your health insurance card  Financial resources to cover your co-payment or balance due at the time of service (cash, personal check, Visa, and MasterCard are acceptable methods of payment)     Due to the demand for new patient evaluations, you must notify the scheduling department 48 hours  in advance if you are not able to keep this appointment. Failure to do so could affect your ability to reschedule with our clinic. Please be aware that we will not prescribe any medications at your first visit.     Please call [phone] with any questions regarding your appointment. We look forward to meeting you and working to address your health care needs.     Sincerely,    [facility]     sharp/aching

## 2020-11-16 ENCOUNTER — HEALTH MAINTENANCE LETTER (OUTPATIENT)
Age: 60
End: 2020-11-16

## 2021-09-18 ENCOUNTER — HEALTH MAINTENANCE LETTER (OUTPATIENT)
Age: 61
End: 2021-09-18

## 2021-11-13 ENCOUNTER — HEALTH MAINTENANCE LETTER (OUTPATIENT)
Age: 61
End: 2021-11-13

## 2022-01-08 ENCOUNTER — HEALTH MAINTENANCE LETTER (OUTPATIENT)
Age: 62
End: 2022-01-08

## 2022-11-19 ENCOUNTER — HEALTH MAINTENANCE LETTER (OUTPATIENT)
Age: 62
End: 2022-11-19

## 2023-04-09 ENCOUNTER — HEALTH MAINTENANCE LETTER (OUTPATIENT)
Age: 63
End: 2023-04-09

## 2023-11-19 ENCOUNTER — HEALTH MAINTENANCE LETTER (OUTPATIENT)
Age: 63
End: 2023-11-19

## 2024-12-31 NOTE — MR AVS SNAPSHOT
After Visit Summary   7/19/2017    Sophia Andrew    MRN: [id number]           Patient Information     Date Of Birth          1960        Visit Information        Provider Department      7/19/2017 2:00 PM Rafaela Tejada APRN [facility]        Today's Diagnoses     Left flank pain    -  1    H/O renal calculi        Chronic left-sided low back pain without sciatica        Lumbosacral spondylosis without myelopathy        Myofascial pain          Care Instructions    PLAN:  1. Follow up with Daniel Ramirez in [facility]   2. Medications:   1. Methocarbamol as needed for muscle spasms, caution sedation  2. Gabapentin as directed.  3. Start methocarbamol first, 3 days before gabapentin  3. Procedures: none  4. Obtain lumbar MRI at [facility], call [phone] to schedule  5. Follow-up with me in 8-10 weeks.      ----------------------------------------------------------------  Nurse Triage line:  [phone]   Call this number with any questions or concerns. You may leave a detailed message anytime. Calls are typically returned Monday through Friday between 8 AM and 4:30 PM. We usually get back to you within 2 business days depending on the issue/request.       Medication refills:    For non-narcotic medications, call your pharmacy directly to request a refill. The pharmacy will contact the [facility] for authorization. Please allow 3-4 days for these refills to be processed.     For narcotic refills, call the nurse triage line or send a [employer] message. Please contact us 7-10 days before your refill is due. The message MUST include the name of the specific medication(s) requested and how you would like to receive the prescription(s). The options are as follows:    Pain Clinic staff can mail the prescription to your pharmacy. Please tell us the name of the pharmacy.    You may pick the prescription up at the Pain Clinic (tell us the location) or during a  clinic visit with your pain provider    Pain Clinic staff can deliver the prescription to the [facility] pharmacy in the clinic building. Please tell us the location.      Scheduling number: [phone].  Call this number to schedule or change appointments.    We believe regular attendance is key to your success in our program.    Any time you are unable to keep your appointment we ask that you call us at least 24 hours in advance to let us know. This will allow us to offer the appointment time to another patient.               Follow-ups after your visit        Future tests that were ordered for you today     Open Future Orders        Priority Expected Expires Ordered    MR Lumbar Spine w/o Contrast Routine  7/19/2018 7/19/2017            Who to contact     If you have questions or need follow up information about today's clinic visit or your schedule please contact [facility] directly at [phone].  Normal or non-critical lab and imaging results will be communicated to you by [employer], letter or phone within 4 business days after the clinic has received the results. If you do not hear from us within 7 days, please contact the clinic through [employer] or phone. If you have a critical or abnormal lab result, we will notify you by phone as soon as possible.  Submit refill requests through [employer] or call your pharmacy and they will forward the refill request to us. Please allow 3 business days for your refill to be completed.          Additional Information About Your Visit        MOG Information     [facility]G gives you secure access to your electronic health record. If you see a primary care provider, you can also send messages to your care team and make appointments. If you have questions, please call your primary care clinic.  If you do not have a primary care provider, please call [phone] and they will assist you.        Care EveryWhere ID     This is your Care EveryWhere ID. This could be used  "by other organizations to access your [facility] medical records  [id number]        Your Vitals Were     Pulse Height BMI (Body Mass Index)             71 1.6 m (5' 3\") 32.06 kg/m2          Blood Pressure from Last 3 Encounters:   07/19/17 (!) 165/99   03/28/17 (!) 148/101   03/07/17 132/85    Weight from Last 3 Encounters:   07/19/17 82.1 kg (181 lb)   03/28/17 79.8 kg (176 lb)   03/07/17 79.8 kg (176 lb)                 Today's Medication Changes          These changes are accurate as of: 7/19/17  2:52 PM.  If you have any questions, ask your nurse or doctor.               Start taking these medicines.        Dose/Directions    gabapentin 300 MG capsule   Commonly known as:  NEURONTIN   Used for:  Left flank pain, H/O renal calculi, Chronic left-sided low back pain without sciatica, Lumbosacral spondylosis without myelopathy, Myofascial pain   Started by:  Rafaela Tejada APRN CNP        Take 300mg at bedtime for 7 days, then 300mg twice daily for 7 days, then take 300mg three times daily for 7 days, then take 300mg in the morning and afternoon and 600mg at bedtime. If side effects, then reduce to last tolerable dosage.   Quantity:  120 capsule   Refills:  0       methocarbamol 500 MG tablet   Commonly known as:  ROBAXIN   Used for:  Myofascial pain, Chronic left-sided low back pain without sciatica, Lumbosacral spondylosis without myelopathy   Started by:  Rafaela Tejada APRN CNP        Dose:  500-1000 mg   Take 1-2 tablets (500-1,000 mg) by mouth 3 times daily as needed for muscle spasms Caution sedation, start with lower dose first   Quantity:  60 tablet   Refills:  1            Where to get your medicines      These medications were sent to [facility]/pharmacy #[id number] - [facility], [place] - [address]  [address] [address]     Phone:  [phone]     methocarbamol 500 MG tablet         Some of these will need a paper prescription and others can be bought over the counter.  Ask your " No nurse if you have questions.     Bring a paper prescription for each of these medications     gabapentin 300 MG capsule                Primary Care Provider Office Phone # Fax #    p [facility] [phone] [address] [facility] [address]        Equal Access to Services     ETTA CHONG : Hadii aad ku hadgradyo Sotyali, waaxda luqadaha, qaybta kaalmada adeegyada, waxbernard linda halicharles guzman gumaromarisa louise. So [facility] [phone].    ATENCIÓN: Si habla español, tiene a schreiber disposición servicios gratuitos de asistencia lingüística. Llame al [phone].    We comply with applicable federal civil rights laws and [place] laws. We do not discriminate on the basis of race, color, national origin, age, disability sex, sexual orientation or gender identity.            Thank you!     Thank you for choosing [facility]  for your care. Our goal is always to provide you with excellent care. Hearing back from our patients is one way we can continue to improve our services. Please take a few minutes to complete the written survey that you may receive in the mail after your visit with us. Thank you!             Your Updated Medication List - Protect others around you: Learn how to safely use, store and throw away your medicines at www.disposemymeds.org.          This list is accurate as of: 7/19/17  2:52 PM.  Always use your most recent med list.                   Brand Name Dispense Instructions for use Diagnosis    ATORVASTATIN CALCIUM PO           gabapentin 300 MG capsule    NEURONTIN    120 capsule    Take 300mg at bedtime for 7 days, then 300mg twice daily for 7 days, then take 300mg three times daily for 7 days, then take 300mg in the morning and afternoon and 600mg at bedtime. If side effects, then reduce to last tolerable dosage.    Left flank pain, H/O renal calculi, Chronic left-sided low back pain without sciatica, Lumbosacral spondylosis without myelopathy,  Myofascial pain       HYDROcodone-acetaminophen 5-325 MG per tablet    NORCO    18 tablet    Take 1 tablet by mouth every 4 hours as needed for pain maximum 18 tablet(s) per day    Flank pain       ibuprofen 600 MG tablet    ADVIL/MOTRIN    30 tablet    Take 1 tablet (600 mg) by mouth every 6 hours as needed for moderate pain    Calculus of kidney       LISINOPRIL PO           methocarbamol 500 MG tablet    ROBAXIN    60 tablet    Take 1-2 tablets (500-1,000 mg) by mouth 3 times daily as needed for muscle spasms Caution sedation, start with lower dose first    Myofascial pain, Chronic left-sided low back pain without sciatica, Lumbosacral spondylosis without myelopathy       tamsulosin 0.4 MG capsule    FLOMAX    50 capsule    Take 1 capsule (0.4 mg) by mouth daily For stent related discomfort    Nephrolithiasis

## (undated) DEVICE — DRAPE LAP W/ARMBOARD 29410

## (undated) DEVICE — TUBING IRRIG TUR Y TYPE 96" LF 6543-01

## (undated) DEVICE — JELLY LUBRICATING SURGILUBE 2OZ TUBE

## (undated) DEVICE — DRAPE MAYO STAND 23X54 8337

## (undated) DEVICE — Device

## (undated) DEVICE — NDL TROCAR TWO-PART 18GA 20CM

## (undated) DEVICE — CATH TRAY FOLEY SURESTEP 16FR WDRAIN BAG STLK LATEX A300316A

## (undated) DEVICE — SU ETHILON 3-0 PS-1 18" 1663H

## (undated) DEVICE — GUIDEWIRE SENSOR DUAL FLEX STR 0.035"X150CM M0066703080

## (undated) DEVICE — SOL NACL 0.9% IRRIG 3000ML BAG 2B7477

## (undated) DEVICE — SYR 30ML LL W/O NDL 302832

## (undated) DEVICE — SPECIMEN CONTAINER 5OZ STERILE 2600SA

## (undated) DEVICE — LASER FIBER HOLMIUM FLEXIVA 200UM M0068403910 840-391

## (undated) DEVICE — BAG DRAIN BILIARY NEPHROSTOMY REMINGTON 600ML LF 600-D

## (undated) DEVICE — CATH URETERAL OPEN END TIP 05FR 70CM 134105LT / 134105RT

## (undated) DEVICE — SYR 10ML FINGER CONTROL W/O NDL 309695

## (undated) DEVICE — NDL 25GA 1.5" 305127

## (undated) DEVICE — DRAPE C-ARM W/STRAPS 42X72" 07-CA104

## (undated) DEVICE — TUBING EXTENSION SET 20" B1327

## (undated) DEVICE — GLOVE PROTEXIS W/NEU-THERA 7.5  2D73TE75

## (undated) DEVICE — SOL WATER IRRIG 1000ML BOTTLE 2F7114

## (undated) DEVICE — PROBE SET CYBERWAND LITHO GYRUS RENAL/BLADDER CW-RBP

## (undated) DEVICE — SUCTION MANIFOLD DORNOCH ULTRA CART UL-CL500

## (undated) DEVICE — RAD RX CONRAY 60% (50ML) 095305 CHARGE PER ML

## (undated) DEVICE — TUBING SUCTION MEDI-VAC 1/4"X20' N620A

## (undated) DEVICE — NDL COUNTER 20CT 31142493

## (undated) DEVICE — GUIDEWIRE AMPLATZ 0.035"X145CM SUPER STIFF G53566

## (undated) DEVICE — SYR 10ML LL W/O NDL

## (undated) DEVICE — SOL NACL 0.9% IRRIG 1000ML BOTTLE 2F7124

## (undated) DEVICE — SHEATH URETERAL ACCESS 9.5FR X 35CM FUS-095035

## (undated) DEVICE — DRAPE IOBAN C-SECTION W/POUCH 30X35" 6657

## (undated) DEVICE — STRAP KNEE/BODY 31143004

## (undated) DEVICE — CATH NEPHROSTOMY 10FR FLEXIMA M001271800

## (undated) DEVICE — LINEN ORTHO PACK 5446

## (undated) RX ORDER — CIPROFLOXACIN 500 MG/1
TABLET, FILM COATED ORAL
Status: DISPENSED
Start: 2017-03-07

## (undated) RX ORDER — FENTANYL CITRATE 50 UG/ML
INJECTION, SOLUTION INTRAMUSCULAR; INTRAVENOUS
Status: DISPENSED
Start: 2017-02-09

## (undated) RX ORDER — OXYCODONE HYDROCHLORIDE 5 MG/1
TABLET ORAL
Status: DISPENSED
Start: 2017-02-09

## (undated) RX ORDER — ONDANSETRON 2 MG/ML
INJECTION INTRAMUSCULAR; INTRAVENOUS
Status: DISPENSED
Start: 2017-02-09